# Patient Record
Sex: MALE | Race: WHITE | NOT HISPANIC OR LATINO | Employment: OTHER | URBAN - METROPOLITAN AREA
[De-identification: names, ages, dates, MRNs, and addresses within clinical notes are randomized per-mention and may not be internally consistent; named-entity substitution may affect disease eponyms.]

---

## 2023-03-06 LAB
BASOPHILS (10*3/UL) IN BLOOD BY AUTOMATED COUNT: 0.06 X10E9/L (ref 0–0.1)
BASOPHILS/100 LEUKOCYTES IN BLOOD BY AUTOMATED COUNT: 0.7 % (ref 0–2)
EOSINOPHILS (10*3/UL) IN BLOOD BY AUTOMATED COUNT: 0.51 X10E9/L (ref 0–0.4)
EOSINOPHILS/100 LEUKOCYTES IN BLOOD BY AUTOMATED COUNT: 6 % (ref 0–6)
ERYTHROCYTE DISTRIBUTION WIDTH (RATIO) BY AUTOMATED COUNT: 13.6 % (ref 11.5–14.5)
ERYTHROCYTE MEAN CORPUSCULAR HEMOGLOBIN CONCENTRATION (G/DL) BY AUTOMATED: 30.6 G/DL (ref 32–36)
ERYTHROCYTE MEAN CORPUSCULAR VOLUME (FL) BY AUTOMATED COUNT: 97 FL (ref 80–100)
ERYTHROCYTES (10*6/UL) IN BLOOD BY AUTOMATED COUNT: 3.84 X10E12/L (ref 4.5–5.9)
HEMATOCRIT (%) IN BLOOD BY AUTOMATED COUNT: 37.3 % (ref 41–52)
HEMOGLOBIN (G/DL) IN BLOOD: 11.4 G/DL (ref 13.5–17.5)
IMMATURE GRANULOCYTES/100 LEUKOCYTES IN BLOOD BY AUTOMATED COUNT: 0.4 % (ref 0–0.9)
LEUKOCYTES (10*3/UL) IN BLOOD BY AUTOMATED COUNT: 8.5 X10E9/L (ref 4.4–11.3)
LYMPHOCYTES (10*3/UL) IN BLOOD BY AUTOMATED COUNT: 1.17 X10E9/L (ref 0.8–3)
LYMPHOCYTES/100 LEUKOCYTES IN BLOOD BY AUTOMATED COUNT: 13.8 % (ref 13–44)
MONOCYTES (10*3/UL) IN BLOOD BY AUTOMATED COUNT: 0.59 X10E9/L (ref 0.05–0.8)
MONOCYTES/100 LEUKOCYTES IN BLOOD BY AUTOMATED COUNT: 7 % (ref 2–10)
NEUTROPHILS (10*3/UL) IN BLOOD BY AUTOMATED COUNT: 6.12 X10E9/L (ref 1.6–5.5)
NEUTROPHILS/100 LEUKOCYTES IN BLOOD BY AUTOMATED COUNT: 72.1 % (ref 40–80)
PLATELETS (10*3/UL) IN BLOOD AUTOMATED COUNT: 174 X10E9/L (ref 150–450)

## 2023-03-07 LAB
INR IN PPP BY COAGULATION ASSAY: 2.1 (ref 0.9–1.1)
PROTHROMBIN TIME (PT) IN PPP BY COAGULATION ASSAY: 24 SEC (ref 9.8–13.4)

## 2023-04-14 LAB
INR IN PPP BY COAGULATION ASSAY: 2 (ref 0.9–1.1)
PROTHROMBIN TIME (PT) IN PPP BY COAGULATION ASSAY: 23.1 SEC (ref 9.8–13.4)

## 2023-05-18 LAB
INR IN PPP BY COAGULATION ASSAY: 3.1 (ref 0.9–1.1)
PROTHROMBIN TIME (PT) IN PPP BY COAGULATION ASSAY: 36.2 SEC (ref 9.8–13.4)

## 2023-06-01 LAB
INR IN PPP BY COAGULATION ASSAY: 1.9 (ref 0.9–1.1)
PROTHROMBIN TIME (PT) IN PPP BY COAGULATION ASSAY: 21.6 SEC (ref 9.8–13.4)

## 2023-06-16 LAB
INR IN PPP BY COAGULATION ASSAY: 2 (ref 0.9–1.1)
PROTHROMBIN TIME (PT) IN PPP BY COAGULATION ASSAY: 23.5 SEC (ref 9.8–13.4)

## 2023-07-05 LAB
INR IN PPP BY COAGULATION ASSAY: 2.5 (ref 0.9–1.1)
PROTHROMBIN TIME (PT) IN PPP BY COAGULATION ASSAY: 28.8 SEC (ref 9.8–12.8)

## 2023-08-02 LAB
INR IN PPP BY COAGULATION ASSAY: 2.6 (ref 0.9–1.1)
PROTHROMBIN TIME (PT) IN PPP BY COAGULATION ASSAY: 30 SEC (ref 9.8–12.8)

## 2023-08-31 LAB
ALANINE AMINOTRANSFERASE (SGPT) (U/L) IN SER/PLAS: 17 U/L (ref 10–52)
ALBUMIN (G/DL) IN SER/PLAS: 4 G/DL (ref 3.4–5)
ALKALINE PHOSPHATASE (U/L) IN SER/PLAS: 68 U/L (ref 33–136)
ANION GAP IN SER/PLAS: 12 MMOL/L (ref 10–20)
ASPARTATE AMINOTRANSFERASE (SGOT) (U/L) IN SER/PLAS: 17 U/L (ref 9–39)
BASOPHILS (10*3/UL) IN BLOOD BY AUTOMATED COUNT: 0.05 X10E9/L (ref 0–0.1)
BASOPHILS/100 LEUKOCYTES IN BLOOD BY AUTOMATED COUNT: 0.7 % (ref 0–2)
BILIRUBIN TOTAL (MG/DL) IN SER/PLAS: 0.5 MG/DL (ref 0–1.2)
CALCIUM (MG/DL) IN SER/PLAS: 9 MG/DL (ref 8.6–10.3)
CARBON DIOXIDE, TOTAL (MMOL/L) IN SER/PLAS: 29 MMOL/L (ref 21–32)
CHLORIDE (MMOL/L) IN SER/PLAS: 106 MMOL/L (ref 98–107)
CHOLESTEROL (MG/DL) IN SER/PLAS: 99 MG/DL (ref 0–199)
CHOLESTEROL IN HDL (MG/DL) IN SER/PLAS: 41.6 MG/DL
CHOLESTEROL IN LDL (MG/DL) IN SER/PLAS BY DIRECT ASSAY: 46 MG/DL (ref 0–129)
CHOLESTEROL/HDL RATIO: 2.4
CREATININE (MG/DL) IN SER/PLAS: 1.39 MG/DL (ref 0.5–1.3)
EOSINOPHILS (10*3/UL) IN BLOOD BY AUTOMATED COUNT: 0.47 X10E9/L (ref 0–0.4)
EOSINOPHILS/100 LEUKOCYTES IN BLOOD BY AUTOMATED COUNT: 6.5 % (ref 0–6)
ERYTHROCYTE DISTRIBUTION WIDTH (RATIO) BY AUTOMATED COUNT: 13.9 % (ref 11.5–14.5)
ERYTHROCYTE MEAN CORPUSCULAR HEMOGLOBIN CONCENTRATION (G/DL) BY AUTOMATED: 32.3 G/DL (ref 32–36)
ERYTHROCYTE MEAN CORPUSCULAR VOLUME (FL) BY AUTOMATED COUNT: 94 FL (ref 80–100)
ERYTHROCYTES (10*6/UL) IN BLOOD BY AUTOMATED COUNT: 4.2 X10E12/L (ref 4.5–5.9)
GFR MALE: 52 ML/MIN/1.73M2
GLUCOSE (MG/DL) IN SER/PLAS: 134 MG/DL (ref 74–99)
HEMATOCRIT (%) IN BLOOD BY AUTOMATED COUNT: 39.3 % (ref 41–52)
HEMOGLOBIN (G/DL) IN BLOOD: 12.7 G/DL (ref 13.5–17.5)
IMMATURE GRANULOCYTES/100 LEUKOCYTES IN BLOOD BY AUTOMATED COUNT: 0.3 % (ref 0–0.9)
INR IN PPP BY COAGULATION ASSAY: 2.3 (ref 0.9–1.1)
LDL: 36 MG/DL (ref 0–99)
LEUKOCYTES (10*3/UL) IN BLOOD BY AUTOMATED COUNT: 7.2 X10E9/L (ref 4.4–11.3)
LYMPHOCYTES (10*3/UL) IN BLOOD BY AUTOMATED COUNT: 1.34 X10E9/L (ref 0.8–3)
LYMPHOCYTES/100 LEUKOCYTES IN BLOOD BY AUTOMATED COUNT: 18.6 % (ref 13–44)
MAGNESIUM (MG/DL) IN SER/PLAS: 1.99 MG/DL (ref 1.6–2.4)
MONOCYTES (10*3/UL) IN BLOOD BY AUTOMATED COUNT: 0.52 X10E9/L (ref 0.05–0.8)
MONOCYTES/100 LEUKOCYTES IN BLOOD BY AUTOMATED COUNT: 7.2 % (ref 2–10)
NATRIURETIC PEPTIDE B (PG/ML) IN SER/PLAS: 309 PG/ML (ref 0–99)
NEUTROPHILS (10*3/UL) IN BLOOD BY AUTOMATED COUNT: 4.8 X10E9/L (ref 1.6–5.5)
NEUTROPHILS/100 LEUKOCYTES IN BLOOD BY AUTOMATED COUNT: 66.7 % (ref 40–80)
PLATELETS (10*3/UL) IN BLOOD AUTOMATED COUNT: 189 X10E9/L (ref 150–450)
POTASSIUM (MMOL/L) IN SER/PLAS: 4.8 MMOL/L (ref 3.5–5.3)
PROTEIN TOTAL: 6.6 G/DL (ref 6.4–8.2)
PROTHROMBIN TIME (PT) IN PPP BY COAGULATION ASSAY: 26.7 SEC (ref 9.8–12.8)
SODIUM (MMOL/L) IN SER/PLAS: 142 MMOL/L (ref 136–145)
TRIGLYCERIDE (MG/DL) IN SER/PLAS: 109 MG/DL (ref 0–149)
UREA NITROGEN (MG/DL) IN SER/PLAS: 33 MG/DL (ref 6–23)
VLDL: 22 MG/DL (ref 0–40)

## 2023-09-27 LAB
INR IN PPP BY COAGULATION ASSAY: 2.6 (ref 0.9–1.1)
PROTHROMBIN TIME (PT) IN PPP BY COAGULATION ASSAY: 29.9 SEC (ref 9.8–12.8)

## 2023-10-03 DIAGNOSIS — R60.0 BILATERAL LEG EDEMA: ICD-10-CM

## 2023-10-25 ENCOUNTER — LAB (OUTPATIENT)
Dept: LAB | Facility: LAB | Age: 78
End: 2023-10-25
Payer: MEDICARE

## 2023-10-25 ENCOUNTER — ANTICOAGULATION - WARFARIN VISIT (OUTPATIENT)
Dept: CARDIOLOGY | Facility: CLINIC | Age: 78
End: 2023-10-25

## 2023-10-25 DIAGNOSIS — I48.0 PAROXYSMAL ATRIAL FIBRILLATION (MULTI): Primary | ICD-10-CM

## 2023-10-25 LAB
INR PPP: 2.3 (ref 0.9–1.1)
PROTHROMBIN TIME: 26.5 SECONDS (ref 9.8–12.8)

## 2023-10-25 PROCEDURE — 85610 PROTHROMBIN TIME: CPT

## 2023-10-25 PROCEDURE — 36415 COLL VENOUS BLD VENIPUNCTURE: CPT

## 2023-11-27 ENCOUNTER — ANTICOAGULATION - WARFARIN VISIT (OUTPATIENT)
Dept: CARDIOLOGY | Facility: CLINIC | Age: 78
End: 2023-11-27

## 2023-11-27 ENCOUNTER — LAB (OUTPATIENT)
Dept: LAB | Facility: LAB | Age: 78
End: 2023-11-27
Payer: MEDICARE

## 2023-11-27 DIAGNOSIS — I48.0 PAROXYSMAL ATRIAL FIBRILLATION (MULTI): Primary | ICD-10-CM

## 2023-11-27 LAB
INR PPP: 2.3 (ref 0.9–1.1)
PROTHROMBIN TIME: 26 SECONDS (ref 9.8–12.8)

## 2023-11-27 PROCEDURE — 36415 COLL VENOUS BLD VENIPUNCTURE: CPT

## 2023-11-27 PROCEDURE — 85610 PROTHROMBIN TIME: CPT

## 2023-11-28 ENCOUNTER — OFFICE VISIT (OUTPATIENT)
Dept: ENDOCRINOLOGY | Facility: CLINIC | Age: 78
End: 2023-11-28
Payer: MEDICARE

## 2023-11-28 VITALS — WEIGHT: 209 LBS | DIASTOLIC BLOOD PRESSURE: 71 MMHG | BODY MASS INDEX: 28.35 KG/M2 | SYSTOLIC BLOOD PRESSURE: 133 MMHG

## 2023-11-28 DIAGNOSIS — E78.5 HYPERLIPIDEMIA, UNSPECIFIED HYPERLIPIDEMIA TYPE: Primary | ICD-10-CM

## 2023-11-28 DIAGNOSIS — Z79.4 TYPE 2 DIABETES MELLITUS WITH DIABETIC POLYNEUROPATHY, WITH LONG-TERM CURRENT USE OF INSULIN (MULTI): ICD-10-CM

## 2023-11-28 DIAGNOSIS — Z97.8 USES SELF-APPLIED CONTINUOUS GLUCOSE MONITORING DEVICE: ICD-10-CM

## 2023-11-28 DIAGNOSIS — E11.42 TYPE 2 DIABETES MELLITUS WITH DIABETIC POLYNEUROPATHY, WITH LONG-TERM CURRENT USE OF INSULIN (MULTI): ICD-10-CM

## 2023-11-28 DIAGNOSIS — I10 HYPERTENSION, UNSPECIFIED TYPE: ICD-10-CM

## 2023-11-28 LAB
POC FINGERSTICK BLOOD GLUCOSE: 99 MG/DL (ref 70–100)
POC HEMOGLOBIN A1C: 7.4 % (ref 4.2–6.5)

## 2023-11-28 PROCEDURE — 83036 HEMOGLOBIN GLYCOSYLATED A1C: CPT | Performed by: STUDENT IN AN ORGANIZED HEALTH CARE EDUCATION/TRAINING PROGRAM

## 2023-11-28 PROCEDURE — 1126F AMNT PAIN NOTED NONE PRSNT: CPT | Performed by: STUDENT IN AN ORGANIZED HEALTH CARE EDUCATION/TRAINING PROGRAM

## 2023-11-28 PROCEDURE — 3075F SYST BP GE 130 - 139MM HG: CPT | Performed by: STUDENT IN AN ORGANIZED HEALTH CARE EDUCATION/TRAINING PROGRAM

## 2023-11-28 PROCEDURE — 1159F MED LIST DOCD IN RCRD: CPT | Performed by: STUDENT IN AN ORGANIZED HEALTH CARE EDUCATION/TRAINING PROGRAM

## 2023-11-28 PROCEDURE — 3078F DIAST BP <80 MM HG: CPT | Performed by: STUDENT IN AN ORGANIZED HEALTH CARE EDUCATION/TRAINING PROGRAM

## 2023-11-28 PROCEDURE — 95251 CONT GLUC MNTR ANALYSIS I&R: CPT | Performed by: STUDENT IN AN ORGANIZED HEALTH CARE EDUCATION/TRAINING PROGRAM

## 2023-11-28 PROCEDURE — 82962 GLUCOSE BLOOD TEST: CPT | Performed by: STUDENT IN AN ORGANIZED HEALTH CARE EDUCATION/TRAINING PROGRAM

## 2023-11-28 PROCEDURE — 99214 OFFICE O/P EST MOD 30 MIN: CPT | Performed by: STUDENT IN AN ORGANIZED HEALTH CARE EDUCATION/TRAINING PROGRAM

## 2023-11-28 RX ORDER — CARVEDILOL PHOSPHATE 80 MG/1
CAPSULE, EXTENDED RELEASE ORAL
COMMUNITY
Start: 2022-03-04 | End: 2024-02-23

## 2023-11-28 RX ORDER — INSULIN GLARGINE 100 [IU]/ML
INJECTION, SOLUTION SUBCUTANEOUS
COMMUNITY
Start: 2014-06-09 | End: 2024-02-05

## 2023-11-28 RX ORDER — CLOBETASOL PROPIONATE 0.46 MG/ML
SOLUTION TOPICAL
COMMUNITY
Start: 2023-07-07

## 2023-11-28 RX ORDER — ATORVASTATIN CALCIUM 40 MG/1
1 TABLET, FILM COATED ORAL DAILY
COMMUNITY
Start: 2014-02-11

## 2023-11-28 RX ORDER — TIOTROPIUM BROMIDE INHALATION SPRAY 3.12 UG/1
2 SPRAY, METERED RESPIRATORY (INHALATION) DAILY
COMMUNITY
Start: 2021-09-15

## 2023-11-28 RX ORDER — FUROSEMIDE 40 MG/1
1 TABLET ORAL 2 TIMES DAILY
COMMUNITY
Start: 2022-04-14 | End: 2024-03-11 | Stop reason: WASHOUT

## 2023-11-28 RX ORDER — DOFETILIDE 0.12 MG/1
1 CAPSULE ORAL 2 TIMES DAILY
COMMUNITY
Start: 2022-06-22 | End: 2024-03-15

## 2023-11-28 RX ORDER — CLOBETASOL PROPIONATE 0.05 G/100ML
SHAMPOO TOPICAL
COMMUNITY
Start: 2023-07-15

## 2023-11-28 RX ORDER — WARFARIN 2.5 MG/1
TABLET ORAL
COMMUNITY
Start: 2021-12-13 | End: 2024-01-30

## 2023-11-28 RX ORDER — INSULIN LISPRO 100 [IU]/ML
INJECTION, SOLUTION INTRAVENOUS; SUBCUTANEOUS
COMMUNITY
Start: 2015-02-23 | End: 2024-02-12

## 2023-11-28 RX ORDER — RAMIPRIL 10 MG/1
1 CAPSULE ORAL DAILY
COMMUNITY
Start: 2022-01-31 | End: 2024-01-23

## 2023-11-28 RX ORDER — METFORMIN HYDROCHLORIDE 1000 MG/1
1 TABLET ORAL DAILY
COMMUNITY
Start: 2016-03-19 | End: 2024-02-26

## 2023-11-28 RX ORDER — CLOBETASOL PROPIONATE 0.5 MG/G
CREAM TOPICAL
COMMUNITY
Start: 2023-07-07

## 2023-11-28 RX ORDER — LANOLIN ALCOHOL/MO/W.PET/CERES
1 CREAM (GRAM) TOPICAL DAILY
COMMUNITY

## 2023-11-28 RX ORDER — ALBUTEROL SULFATE 90 UG/1
2 AEROSOL, METERED RESPIRATORY (INHALATION) EVERY 4 HOURS PRN
COMMUNITY
Start: 2023-09-22

## 2023-11-28 RX ORDER — POTASSIUM CHLORIDE 750 MG/1
1 TABLET, EXTENDED RELEASE ORAL DAILY
COMMUNITY
Start: 2021-12-06 | End: 2024-05-24

## 2023-11-28 ASSESSMENT — ENCOUNTER SYMPTOMS: CONSTITUTIONAL NEGATIVE: 1

## 2023-11-28 NOTE — PROGRESS NOTES
Subjective   Patient ID: Servando Wells is a 78 y.o. male who presents for Diabetes.  Diabetes        A 78 yr old male, with hx of DM II, well controlled, complicated by neuropathy, presented for follow up.   He feels well , denies symptomatic lows. Unclear if lowe on CGM is accurate  He did not lower insulin dose as advised last visit  He gets laser treatment for neuropathy.  A1c is  7.4 was 6.6.     CGM review:  -Very high: 7 %  -High 14 %  -Target range 65 %  -Low 7 %  -Very low 7 %     Review of Systems   Constitutional: Negative.        Objective   Physical Exam  Constitutional:       Appearance: Normal appearance.   Cardiovascular:      Rate and Rhythm: Normal rate.   Neurological:      Mental Status: He is alert.         Assessment/Plan     Type II DM, well controlled, on MDI A1c 7.4 was 6.6 ,  on  MDI and Metformin, complicated  with diabetic neuropathy ( s/p laser treatment ) and hypoglycemia.  HLD well controlled on atorvasatin 40 mg   HTN controlled on ACE         Plan:  - ok to continue metformin 1000 mg BID  daily GFR 52   - Decrease Lantus to 18 units at night ( currently taking dose between 18-22 units)  -Continue humlaog 6 units with meals, plus sliding scale SS2     RTC in 3 months

## 2023-12-07 PROBLEM — I13.10 HYPERTENSIVE HEART AND CHRONIC KIDNEY DISEASE: Status: ACTIVE | Noted: 2023-12-07

## 2023-12-07 PROBLEM — M47.816 DEGENERATIVE JOINT DISEASE (DJD) OF LUMBAR SPINE: Status: ACTIVE | Noted: 2023-12-07

## 2023-12-07 PROBLEM — I10 ESSENTIAL HYPERTENSION, BENIGN: Status: ACTIVE | Noted: 2023-12-07

## 2023-12-07 PROBLEM — I51.89 DIASTOLIC DYSFUNCTION: Status: ACTIVE | Noted: 2023-12-07

## 2023-12-07 PROBLEM — G95.9 CERVICAL MYELOPATHY (MULTI): Status: ACTIVE | Noted: 2023-12-07

## 2023-12-07 PROBLEM — H90.3 ASYMMETRIC SNHL (SENSORINEURAL HEARING LOSS): Status: ACTIVE | Noted: 2023-12-07

## 2023-12-07 PROBLEM — R26.9 GAIT DISTURBANCE: Status: ACTIVE | Noted: 2023-12-07

## 2023-12-07 PROBLEM — L30.8 ASTEATOTIC ECZEMA: Status: ACTIVE | Noted: 2023-12-07

## 2023-12-07 PROBLEM — I50.30: Status: ACTIVE | Noted: 2023-12-07

## 2023-12-07 PROBLEM — R94.31 ABNORMAL EKG: Status: ACTIVE | Noted: 2023-12-07

## 2023-12-07 PROBLEM — H73.12 CHRONIC MYRINGITIS OF LEFT EAR: Status: ACTIVE | Noted: 2023-12-07

## 2023-12-07 PROBLEM — R60.0 BILATERAL LEG EDEMA: Status: ACTIVE | Noted: 2023-12-07

## 2023-12-07 PROBLEM — M16.0 DEGENERATIVE JOINT DISEASE OF BOTH HIPS: Status: ACTIVE | Noted: 2023-12-07

## 2023-12-07 PROBLEM — M50.03: Status: ACTIVE | Noted: 2023-12-07

## 2023-12-07 PROBLEM — G47.30 SLEEP APNEA: Status: ACTIVE | Noted: 2023-12-07

## 2023-12-07 PROBLEM — I11.9 HYPERTENSIVE HEART DISEASE: Status: ACTIVE | Noted: 2023-12-07

## 2023-12-07 PROBLEM — E11.40 DIABETES MELLITUS WITH NEUROPATHY (MULTI): Status: ACTIVE | Noted: 2023-12-07

## 2023-12-07 PROBLEM — R29.898 LEG WEAKNESS: Status: ACTIVE | Noted: 2023-12-07

## 2023-12-07 PROBLEM — C67.9 MALIGNANT NEOPLASM OF URINARY BLADDER (MULTI): Status: ACTIVE | Noted: 2023-12-07

## 2023-12-07 PROBLEM — L40.9 PSORIASIS: Status: ACTIVE | Noted: 2023-12-07

## 2023-12-07 PROBLEM — E78.5 HYPERLIPIDEMIA: Status: ACTIVE | Noted: 2023-12-07

## 2023-12-07 PROBLEM — H90.A32 MIXED CONDUCTIVE AND SENSORINEURAL HEARING LOSS, UNILATERAL, LEFT EAR WITH RESTRICTED HEARING ON THE CONTRALATERAL SIDE: Status: ACTIVE | Noted: 2023-12-07

## 2023-12-07 PROBLEM — R76.8 ANA POSITIVE: Status: ACTIVE | Noted: 2023-12-07

## 2023-12-07 PROBLEM — S76.319A HAMSTRING STRAIN: Status: ACTIVE | Noted: 2023-12-07

## 2023-12-07 PROBLEM — I48.0 PAROXYSMAL ATRIAL FIBRILLATION (MULTI): Status: ACTIVE | Noted: 2023-12-07

## 2023-12-07 PROBLEM — M17.9 OSTEOARTHRITIS OF KNEE: Status: ACTIVE | Noted: 2023-12-07

## 2023-12-07 RX ORDER — FUROSEMIDE 40 MG/1
40 TABLET ORAL 2 TIMES DAILY
Qty: 180 TABLET | Refills: 1 | Status: SHIPPED | OUTPATIENT
Start: 2023-12-07 | End: 2024-05-17

## 2023-12-26 ENCOUNTER — LAB (OUTPATIENT)
Dept: LAB | Facility: LAB | Age: 78
End: 2023-12-26
Payer: MEDICARE

## 2023-12-26 ENCOUNTER — ANTICOAGULATION - WARFARIN VISIT (OUTPATIENT)
Dept: CARDIOLOGY | Facility: CLINIC | Age: 78
End: 2023-12-26

## 2023-12-26 DIAGNOSIS — I48.0 PAROXYSMAL ATRIAL FIBRILLATION (MULTI): Primary | ICD-10-CM

## 2023-12-26 LAB
INR PPP: 2 (ref 0.9–1.1)
PROTHROMBIN TIME: 23 SECONDS (ref 9.8–12.8)

## 2023-12-26 PROCEDURE — 85610 PROTHROMBIN TIME: CPT

## 2023-12-26 PROCEDURE — 36415 COLL VENOUS BLD VENIPUNCTURE: CPT

## 2024-01-19 DIAGNOSIS — I10 ESSENTIAL HYPERTENSION, BENIGN: Primary | ICD-10-CM

## 2024-01-22 ENCOUNTER — LAB (OUTPATIENT)
Dept: LAB | Facility: LAB | Age: 79
End: 2024-01-22
Payer: MEDICARE

## 2024-01-22 ENCOUNTER — ANTICOAGULATION - WARFARIN VISIT (OUTPATIENT)
Dept: CARDIOLOGY | Facility: CLINIC | Age: 79
End: 2024-01-22

## 2024-01-22 DIAGNOSIS — I48.0 PAROXYSMAL ATRIAL FIBRILLATION (MULTI): Primary | ICD-10-CM

## 2024-01-22 LAB
INR PPP: 2 (ref 0.9–1.1)
PROTHROMBIN TIME: 22.7 SECONDS (ref 9.8–12.8)

## 2024-01-22 PROCEDURE — 36415 COLL VENOUS BLD VENIPUNCTURE: CPT

## 2024-01-22 PROCEDURE — 85610 PROTHROMBIN TIME: CPT

## 2024-01-23 RX ORDER — RAMIPRIL 10 MG/1
10 CAPSULE ORAL DAILY
Qty: 90 CAPSULE | Refills: 0 | Status: SHIPPED | OUTPATIENT
Start: 2024-01-23 | End: 2024-04-22

## 2024-01-30 DIAGNOSIS — I48.0 PAROXYSMAL ATRIAL FIBRILLATION (MULTI): Primary | ICD-10-CM

## 2024-01-30 RX ORDER — WARFARIN 2.5 MG/1
TABLET ORAL
Qty: 270 TABLET | Refills: 1 | Status: SHIPPED | OUTPATIENT
Start: 2024-01-30

## 2024-02-03 DIAGNOSIS — Z79.4 TYPE 2 DIABETES MELLITUS WITH DIABETIC POLYNEUROPATHY, WITH LONG-TERM CURRENT USE OF INSULIN (MULTI): ICD-10-CM

## 2024-02-03 DIAGNOSIS — E11.42 TYPE 2 DIABETES MELLITUS WITH DIABETIC POLYNEUROPATHY, WITH LONG-TERM CURRENT USE OF INSULIN (MULTI): ICD-10-CM

## 2024-02-05 RX ORDER — INSULIN GLARGINE 100 [IU]/ML
18 INJECTION, SOLUTION SUBCUTANEOUS DAILY
Qty: 30 ML | Refills: 1 | Status: SHIPPED | OUTPATIENT
Start: 2024-02-05 | End: 2024-02-08 | Stop reason: SDUPTHER

## 2024-02-08 ENCOUNTER — TELEPHONE (OUTPATIENT)
Dept: ENDOCRINOLOGY | Facility: CLINIC | Age: 79
End: 2024-02-08
Payer: MEDICARE

## 2024-02-08 DIAGNOSIS — E11.42 TYPE 2 DIABETES MELLITUS WITH DIABETIC POLYNEUROPATHY, WITH LONG-TERM CURRENT USE OF INSULIN (MULTI): ICD-10-CM

## 2024-02-08 DIAGNOSIS — Z79.4 TYPE 2 DIABETES MELLITUS WITH DIABETIC POLYNEUROPATHY, WITH LONG-TERM CURRENT USE OF INSULIN (MULTI): ICD-10-CM

## 2024-02-12 RX ORDER — INSULIN GLARGINE 100 [IU]/ML
18 INJECTION, SOLUTION SUBCUTANEOUS DAILY
Qty: 30 ML | Refills: 1 | Status: SHIPPED | OUTPATIENT
Start: 2024-02-12

## 2024-02-12 RX ORDER — INSULIN LISPRO 100 [IU]/ML
INJECTION, SOLUTION INTRAVENOUS; SUBCUTANEOUS
Qty: 45 ML | Refills: 1 | Status: SHIPPED | OUTPATIENT
Start: 2024-02-12

## 2024-02-20 ENCOUNTER — ANTICOAGULATION - WARFARIN VISIT (OUTPATIENT)
Dept: PRIMARY CARE | Facility: CLINIC | Age: 79
End: 2024-02-20

## 2024-02-20 ENCOUNTER — LAB (OUTPATIENT)
Dept: LAB | Facility: LAB | Age: 79
End: 2024-02-20
Payer: MEDICARE

## 2024-02-20 DIAGNOSIS — I48.0 PAROXYSMAL ATRIAL FIBRILLATION (MULTI): Primary | ICD-10-CM

## 2024-02-20 LAB
INR PPP: 1.6 (ref 0.9–1.1)
PROTHROMBIN TIME: 18.6 SECONDS (ref 9.8–12.8)

## 2024-02-20 PROCEDURE — 85610 PROTHROMBIN TIME: CPT

## 2024-02-20 PROCEDURE — 36415 COLL VENOUS BLD VENIPUNCTURE: CPT

## 2024-02-23 DIAGNOSIS — I11.9 HYPERTENSIVE HEART DISEASE, UNSPECIFIED WHETHER HEART FAILURE PRESENT: Primary | ICD-10-CM

## 2024-02-23 RX ORDER — CARVEDILOL PHOSPHATE 80 MG/1
80 CAPSULE, EXTENDED RELEASE ORAL DAILY
Qty: 90 CAPSULE | Refills: 3 | Status: SHIPPED | OUTPATIENT
Start: 2024-02-23

## 2024-02-26 DIAGNOSIS — Z79.4 TYPE 2 DIABETES MELLITUS WITH DIABETIC POLYNEUROPATHY, WITH LONG-TERM CURRENT USE OF INSULIN (MULTI): ICD-10-CM

## 2024-02-26 DIAGNOSIS — E11.42 TYPE 2 DIABETES MELLITUS WITH DIABETIC POLYNEUROPATHY, WITH LONG-TERM CURRENT USE OF INSULIN (MULTI): ICD-10-CM

## 2024-02-26 RX ORDER — METFORMIN HYDROCHLORIDE 1000 MG/1
1000 TABLET ORAL DAILY
Qty: 90 TABLET | Refills: 1 | Status: SHIPPED | OUTPATIENT
Start: 2024-02-26

## 2024-03-11 ENCOUNTER — OFFICE VISIT (OUTPATIENT)
Dept: CARDIOLOGY | Facility: CLINIC | Age: 79
End: 2024-03-11
Payer: MEDICARE

## 2024-03-11 ENCOUNTER — LAB (OUTPATIENT)
Dept: LAB | Facility: LAB | Age: 79
End: 2024-03-11
Payer: MEDICARE

## 2024-03-11 ENCOUNTER — ANTICOAGULATION - WARFARIN VISIT (OUTPATIENT)
Dept: CARDIOLOGY | Facility: CLINIC | Age: 79
End: 2024-03-11

## 2024-03-11 VITALS
HEART RATE: 62 BPM | WEIGHT: 212 LBS | BODY MASS INDEX: 28.71 KG/M2 | SYSTOLIC BLOOD PRESSURE: 118 MMHG | TEMPERATURE: 97.2 F | HEIGHT: 72 IN | DIASTOLIC BLOOD PRESSURE: 56 MMHG

## 2024-03-11 DIAGNOSIS — I48.0 PAROXYSMAL ATRIAL FIBRILLATION (MULTI): ICD-10-CM

## 2024-03-11 DIAGNOSIS — E78.2 MIXED HYPERLIPIDEMIA: ICD-10-CM

## 2024-03-11 DIAGNOSIS — Z87.891 FORMER SMOKER: ICD-10-CM

## 2024-03-11 DIAGNOSIS — Z79.01 CHRONIC ANTICOAGULATION: ICD-10-CM

## 2024-03-11 DIAGNOSIS — I10 ESSENTIAL HYPERTENSION, BENIGN: ICD-10-CM

## 2024-03-11 DIAGNOSIS — R60.0 BILATERAL LEG EDEMA: ICD-10-CM

## 2024-03-11 DIAGNOSIS — I50.30: Primary | ICD-10-CM

## 2024-03-11 DIAGNOSIS — I48.0 PAROXYSMAL ATRIAL FIBRILLATION (MULTI): Primary | ICD-10-CM

## 2024-03-11 DIAGNOSIS — I50.30: ICD-10-CM

## 2024-03-11 LAB
ANION GAP SERPL CALC-SCNC: 12 MMOL/L (ref 10–20)
BUN SERPL-MCNC: 39 MG/DL (ref 6–23)
CALCIUM SERPL-MCNC: 9.2 MG/DL (ref 8.6–10.3)
CHLORIDE SERPL-SCNC: 107 MMOL/L (ref 98–107)
CO2 SERPL-SCNC: 30 MMOL/L (ref 21–32)
CREAT SERPL-MCNC: 1.66 MG/DL (ref 0.5–1.3)
EGFRCR SERPLBLD CKD-EPI 2021: 42 ML/MIN/1.73M*2
ERYTHROCYTE [DISTWIDTH] IN BLOOD BY AUTOMATED COUNT: 13.4 % (ref 11.5–14.5)
GLUCOSE SERPL-MCNC: 65 MG/DL (ref 74–99)
HCT VFR BLD AUTO: 35.6 % (ref 41–52)
HGB BLD-MCNC: 11.5 G/DL (ref 13.5–17.5)
INR PPP: 2.2 (ref 0.9–1.1)
MCH RBC QN AUTO: 30.2 PG (ref 26–34)
MCHC RBC AUTO-ENTMCNC: 32.3 G/DL (ref 32–36)
MCV RBC AUTO: 93 FL (ref 80–100)
NRBC BLD-RTO: 0 /100 WBCS (ref 0–0)
PLATELET # BLD AUTO: 183 X10*3/UL (ref 150–450)
POTASSIUM SERPL-SCNC: 4.9 MMOL/L (ref 3.5–5.3)
PROTHROMBIN TIME: 24.9 SECONDS (ref 9.8–12.8)
RBC # BLD AUTO: 3.81 X10*6/UL (ref 4.5–5.9)
SODIUM SERPL-SCNC: 144 MMOL/L (ref 136–145)
WBC # BLD AUTO: 9.7 X10*3/UL (ref 4.4–11.3)

## 2024-03-11 PROCEDURE — 1160F RVW MEDS BY RX/DR IN RCRD: CPT | Performed by: INTERNAL MEDICINE

## 2024-03-11 PROCEDURE — 36415 COLL VENOUS BLD VENIPUNCTURE: CPT

## 2024-03-11 PROCEDURE — 85027 COMPLETE CBC AUTOMATED: CPT

## 2024-03-11 PROCEDURE — 83880 ASSAY OF NATRIURETIC PEPTIDE: CPT

## 2024-03-11 PROCEDURE — 1126F AMNT PAIN NOTED NONE PRSNT: CPT | Performed by: INTERNAL MEDICINE

## 2024-03-11 PROCEDURE — 85610 PROTHROMBIN TIME: CPT

## 2024-03-11 PROCEDURE — 99214 OFFICE O/P EST MOD 30 MIN: CPT | Performed by: INTERNAL MEDICINE

## 2024-03-11 PROCEDURE — 3078F DIAST BP <80 MM HG: CPT | Performed by: INTERNAL MEDICINE

## 2024-03-11 PROCEDURE — 3074F SYST BP LT 130 MM HG: CPT | Performed by: INTERNAL MEDICINE

## 2024-03-11 PROCEDURE — 1159F MED LIST DOCD IN RCRD: CPT | Performed by: INTERNAL MEDICINE

## 2024-03-11 PROCEDURE — 80048 BASIC METABOLIC PNL TOTAL CA: CPT

## 2024-03-11 PROCEDURE — 1036F TOBACCO NON-USER: CPT | Performed by: INTERNAL MEDICINE

## 2024-03-11 ASSESSMENT — ENCOUNTER SYMPTOMS
PALPITATIONS: 0
NUMBNESS: 1
DIFFICULTY URINATING: 1
RESPIRATORY NEGATIVE: 1
PSYCHIATRIC NEGATIVE: 1
ACTIVITY CHANGE: 1
GASTROINTESTINAL NEGATIVE: 1
ARTHRALGIAS: 1
APPETITE CHANGE: 0

## 2024-03-11 ASSESSMENT — PATIENT HEALTH QUESTIONNAIRE - PHQ9
SUM OF ALL RESPONSES TO PHQ9 QUESTIONS 1 AND 2: 0
2. FEELING DOWN, DEPRESSED OR HOPELESS: NOT AT ALL
1. LITTLE INTEREST OR PLEASURE IN DOING THINGS: NOT AT ALL

## 2024-03-11 NOTE — PROGRESS NOTES
"  Patient:  Servando Wells  YOB: 1945  MRN: [id number]       Chief Complaint/Active Symptoms:       Servando Wells is a 78 y.o. male who returns today for cardiac follow-up.     Here for routine follow-up. Biggest is his leg fatigue and neuropathy. Is waiting for a \"machine\" that is supposed to help neuropathy. Has tingling and sharp stabbing pains to mid-shin. Difficult for him to walk with this.     No chest pain or discomfort, no shortness of breath. Edema well controlled on his current diuretic regimen. No palpitations. No dizziness or lightheadedness (except when sugar is low).     No other concerns than his neuropathy.     Review of Systems   Constitutional:  Positive for activity change. Negative for appetite change.   HENT:  Positive for hearing loss.    Respiratory: Negative.     Cardiovascular:  Positive for leg swelling. Negative for chest pain and palpitations.   Gastrointestinal: Negative.    Genitourinary:  Positive for difficulty urinating.   Musculoskeletal:  Positive for arthralgias.   Neurological:  Positive for numbness.   Psychiatric/Behavioral: Negative.     All other systems reviewed and are negative.      Objective:     Vitals:    03/11/24 1108   BP: 118/56   Pulse: 62   Temp: 36.2 °C (97.2 °F)       Vitals:    03/11/24 1108   Weight: 96.2 kg (212 lb)       Allergies:     No Known Allergies     Medications:     Current Outpatient Medications   Medication Instructions    albuterol 90 mcg/actuation inhaler 2 puffs, inhalation, Every 4 hours PRN    atorvastatin (Lipitor) 40 mg tablet 1 tablet, oral, Daily    carvedilol CR (COREG CR) 80 mg, oral, Daily    clobetasol (Temovate) 0.05 % cream     clobetasol (Temovate) 0.05 % external solution     clobetasoL 0.05 % shampoo     dofetilide (Tikosyn) 125 mcg capsule 1 capsule, oral, 2 times daily    furosemide (LASIX) 40 mg, oral, 2 times daily    HumaLOG KwikPen Insulin 100 unit/mL injection INJECT 8 UNITS BEFORE MEALS PLUS SLIDING SCALE    " Lantus Solostar U-100 Insulin 18 Units, subcutaneous, Daily    magnesium oxide (Mag-Ox) 400 mg (241.3 mg magnesium) tablet 1 tablet, oral, Daily    metFORMIN (GLUCOPHAGE) 1,000 mg, oral, Daily    potassium chloride CR 10 mEq ER tablet 1 tablet, oral, Daily    ramipril (ALTACE) 10 mg, oral, Daily    Spiriva Respimat 2.5 mcg/actuation inhaler 2 puffs, inhalation, Daily    warfarin (Coumadin) 2.5 mg tablet TAKE 1 TO 3 TABLETS DAILY OR AS DIRECTED BY NO       Physical Examination:   GENERAL:  Well developed, well nourished, in no acute distress.  HEENT: NC AT, EOMI with anicteric sclera  NECK: Markedly reduced range of motion, no JVD, no bruit.  CHEST:  Symmetric and nontender.  LUNGS:  Clear to auscultation bilaterally, normal respiratory effort.  Diminished at the bases  HEART: PMI is nonpalpable.  There is a regular rhythm breathing irregular with a normal S1 and S2 without S3.  There is a soft crescendo murmur present at the right upper sternal border no diastolic murmur.  There is 1+ ankle edema bilaterally on the right slightly more than the left with venous varicosities.  Normal distal perfusion.    ABDOMEN: Soft, NT, ND without palpable organomegaly or bruits  EXTREMITIES:  Warm with good color, no clubbing or cyanosis.  There is 1+ ankle edema noted.  PERIPHERAL VASCULAR:  Pulses present and equally palpable;   MUSCULOSKELETAL: Arthritic changes.  Patient ambulates slowly with a cane  NEURO/PSYCH:  Alert and oriented times three with approppriate behavior and responses. Nonfocal motor examination.  Lymph: No significant palpable lymphadenopathy  Skin: no rash or lesions on exposed skin or reported.    Lab:     CBC:   Lab Results   Component Value Date    WBC 7.2 08/31/2023    RBC 4.20 (L) 08/31/2023    HGB 12.7 (L) 08/31/2023    HCT 39.3 (L) 08/31/2023     08/31/2023        CMP:    Lab Results   Component Value Date     08/31/2023    K 4.8 08/31/2023     08/31/2023    CO2 29 08/31/2023     "BUN 33 (H) 08/31/2023    CREATININE 1.39 (H) 08/31/2023    GLUCOSE 134 (H) 08/31/2023    CALCIUM 9.0 08/31/2023       Magnesium:    Lab Results   Component Value Date    MG 1.99 08/31/2023       Lipid Profile:    Lab Results   Component Value Date    TRIG 109 08/31/2023    HDL 41.6 08/31/2023    LDLDIRECT 46 08/31/2023       TSH:    Lab Results   Component Value Date    TSH 3.33 01/07/2021       BNP:   Lab Results   Component Value Date     (H) 08/31/2023        PT/INR:    Lab Results   Component Value Date    PROTIME 18.6 (H) 02/20/2024    INR 1.6 (H) 02/20/2024       HgBA1c:    Lab Results   Component Value Date    HGBA1C 7.4 (A) 11/28/2023       BMP:  Lab Results   Component Value Date     08/31/2023     08/29/2022     01/31/2022    K 4.8 08/31/2023    K 4.7 08/29/2022    K 4.9 01/31/2022     08/31/2023     08/29/2022     01/31/2022    CO2 29 08/31/2023    CO2 28 08/29/2022    CO2 30 01/31/2022    BUN 33 (H) 08/31/2023    BUN 54 (H) 08/29/2022    BUN 24 (H) 01/31/2022    CREATININE 1.39 (H) 08/31/2023    CREATININE 1.76 (H) 08/29/2022    CREATININE 1.39 (H) 01/31/2022       Cardiac Enzymes:    No results found for: \"TROPHS\"    Hepatic Function Panel:    Lab Results   Component Value Date    ALKPHOS 68 08/31/2023    ALT 17 08/31/2023    AST 17 08/31/2023    PROT 6.6 08/31/2023    BILITOT 0.5 08/31/2023         Diagnostic Studies:     No echocardiogram results found for the past 12 months    No nuclear medicine results found for the past 12 months    No valid procedures specified.    EKG:   No results found for: \"EKG\"    Radiology:     No orders to display     ASSESSMENT     Problem List Items Addressed This Visit       Bilateral leg edema    Essential hypertension, benign    Heart failure with preserved ejection fraction, borderline, class III (CMS/HCC) - Primary    Relevant Orders    Basic Metabolic Panel    B-Type Natriuretic Peptide    Hyperlipidemia    Paroxysmal " atrial fibrillation (CMS/HCC)    Relevant Orders    CBC    Chronic anticoagulation    Relevant Orders    CBC    Former smoker       PLAN   1.  Chronic diastolic heart failure.  Reasonably compensated with only mild peripheral edema but without JVD or pulmonary edema.  Continue his current therapy.  2.  Benign essential hypertension.  Blood pressures are controlled on his current medical regimen.  3.  Paroxysmal atrial fibrillation on chronic anticoagulation.  No bleeding complications but will check a CBC with his heart failure we will check his electrolytes at the same time.  4.  Bilateral leg edema.  Reasonably controlled with his current diuretic regimen and salt restriction.  5.  Hyperlipidemia.  Patient is on conservative medical management.  He will be due for his repeat lipid panel with his labs late this summer early this fall.  6.  Tobacco and weight status.  The patient is a current non-smoker BMI is 28 we have encouraged heart healthy Mediterranean diet.    His lab work is stable we will see him in follow-up in 6 months sooner if there is any problems or concerns

## 2024-03-12 LAB — BNP SERPL-MCNC: 322 PG/ML (ref 0–99)

## 2024-03-13 ENCOUNTER — TELEPHONE (OUTPATIENT)
Dept: CARDIOLOGY | Facility: CLINIC | Age: 79
End: 2024-03-13
Payer: MEDICARE

## 2024-03-13 NOTE — TELEPHONE ENCOUNTER
----- Message from Eliane Bain LPN sent at 3/13/2024  7:44 AM EDT -----    ----- Message -----  From: Jennifer Dyson MD  Sent: 3/12/2024   3:49 PM EDT  To: Eliane Bain LPN    Mild worsening of his kidney insufficiency.  Has he had his diuretics increased or is even not drinking as much fluids recently.  If so please have him go back to his previous doses and if he has not seen nephrologist we need to have him referred from 1  ----- Message -----  From: Lab, Background User  Sent: 3/11/2024   8:12 PM EDT  To: Jennifer Dyson MD

## 2024-03-14 DIAGNOSIS — I48.0 PAROXYSMAL ATRIAL FIBRILLATION (MULTI): Primary | ICD-10-CM

## 2024-03-15 RX ORDER — DOFETILIDE 0.12 MG/1
125 CAPSULE ORAL 2 TIMES DAILY
Qty: 180 CAPSULE | Refills: 1 | Status: SHIPPED | OUTPATIENT
Start: 2024-03-15 | End: 2024-09-11

## 2024-03-21 ENCOUNTER — TELEPHONE (OUTPATIENT)
Dept: CARDIOLOGY | Facility: CLINIC | Age: 79
End: 2024-03-21
Payer: MEDICARE

## 2024-03-21 NOTE — TELEPHONE ENCOUNTER
Patient advised.     Labs for heart failure unchanged - if he is doing well would hold tight on current regimen. If worse let me know and will increase diuretics.

## 2024-03-25 ENCOUNTER — OFFICE VISIT (OUTPATIENT)
Dept: ENDOCRINOLOGY | Facility: CLINIC | Age: 79
End: 2024-03-25
Payer: MEDICARE

## 2024-03-25 VITALS
BODY MASS INDEX: 28.71 KG/M2 | HEIGHT: 72 IN | WEIGHT: 212 LBS | SYSTOLIC BLOOD PRESSURE: 153 MMHG | DIASTOLIC BLOOD PRESSURE: 64 MMHG

## 2024-03-25 DIAGNOSIS — E11.42 TYPE 2 DIABETES MELLITUS WITH DIABETIC POLYNEUROPATHY, WITH LONG-TERM CURRENT USE OF INSULIN (MULTI): ICD-10-CM

## 2024-03-25 DIAGNOSIS — E16.2 HYPOGLYCEMIA: Primary | ICD-10-CM

## 2024-03-25 DIAGNOSIS — Z97.8 USES SELF-APPLIED CONTINUOUS GLUCOSE MONITORING DEVICE: ICD-10-CM

## 2024-03-25 DIAGNOSIS — Z79.4 TYPE 2 DIABETES MELLITUS WITH DIABETIC POLYNEUROPATHY, WITH LONG-TERM CURRENT USE OF INSULIN (MULTI): ICD-10-CM

## 2024-03-25 LAB
POC FINGERSTICK BLOOD GLUCOSE: 119 MG/DL (ref 70–100)
POC HEMOGLOBIN A1C: 6.8 % (ref 4.2–6.5)

## 2024-03-25 PROCEDURE — 3077F SYST BP >= 140 MM HG: CPT | Performed by: STUDENT IN AN ORGANIZED HEALTH CARE EDUCATION/TRAINING PROGRAM

## 2024-03-25 PROCEDURE — 99214 OFFICE O/P EST MOD 30 MIN: CPT | Performed by: STUDENT IN AN ORGANIZED HEALTH CARE EDUCATION/TRAINING PROGRAM

## 2024-03-25 PROCEDURE — 1159F MED LIST DOCD IN RCRD: CPT | Performed by: STUDENT IN AN ORGANIZED HEALTH CARE EDUCATION/TRAINING PROGRAM

## 2024-03-25 PROCEDURE — 82962 GLUCOSE BLOOD TEST: CPT | Performed by: STUDENT IN AN ORGANIZED HEALTH CARE EDUCATION/TRAINING PROGRAM

## 2024-03-25 PROCEDURE — 83036 HEMOGLOBIN GLYCOSYLATED A1C: CPT | Performed by: STUDENT IN AN ORGANIZED HEALTH CARE EDUCATION/TRAINING PROGRAM

## 2024-03-25 PROCEDURE — 95251 CONT GLUC MNTR ANALYSIS I&R: CPT | Performed by: STUDENT IN AN ORGANIZED HEALTH CARE EDUCATION/TRAINING PROGRAM

## 2024-03-25 PROCEDURE — 3078F DIAST BP <80 MM HG: CPT | Performed by: STUDENT IN AN ORGANIZED HEALTH CARE EDUCATION/TRAINING PROGRAM

## 2024-03-25 PROCEDURE — 1036F TOBACCO NON-USER: CPT | Performed by: STUDENT IN AN ORGANIZED HEALTH CARE EDUCATION/TRAINING PROGRAM

## 2024-03-25 PROCEDURE — 1160F RVW MEDS BY RX/DR IN RCRD: CPT | Performed by: STUDENT IN AN ORGANIZED HEALTH CARE EDUCATION/TRAINING PROGRAM

## 2024-03-25 ASSESSMENT — ENCOUNTER SYMPTOMS: CONSTITUTIONAL NEGATIVE: 1

## 2024-03-25 NOTE — PROGRESS NOTES
Subjective   Patient ID: Servando Wells is a 78 y.o. male who presents for Diabetes (DX dm: >15 years ago /PCP: Puma Mahan /Podiatry: neuropathy-sees  /Eye exam: 1.5 years ago, does have apt soon /Pt testing glucose 4 times daily with freestyle fernanda (original) - READER. Due to fluctuating blood glucose, hypoglycemia and 4 insulin injections. Adjusting meal time insulin based on glucose; compliant with dm regimen/Last hga1c 11/28/23 7.4%/).  Lab Results   Component Value Date    HGBA1C 6.8 (A) 03/25/2024      HPI  A 78 yr old male, with hx of DM II, well controlled, complicated by neuropathy, presented for follow up.   He is doing well, no new concern other recurrence of neuropathy he will   restart laser treatment for neuropathy with Dr. Burris   His dog was diagnosed with DM   A1c is  6.8 was 7.4 , 6.6.      I personally reviewed CGM downloads  -Very high: 0 %  -High 13%  -Target range 66 %  -Low 10 %  -Very low  11%     He states lows are not accurate , but does admit to using humalog before going to bed if BG was high or if he ate a high carb dinner/snack        Review of Systems   Constitutional: Negative.        Objective   Physical Exam Visit Vitals  /64   Ht 1.829 m (6')   Wt 96.2 kg (212 lb) Comment: per patient- balance issue so pt gave verbal weight   BMI 28.75 kg/m²   Smoking Status Former   BSA 2.21 m²        Assessment/Plan        Type II DM, well controlled, on MDI A1c 7.4 was 6.8 ,  on  MDI and Metformin, complicated  with diabetic neuropathy ( s/p laser treatment ) and hypoglycemia.    HLD well controlled on atorvasatin 40 mg   HTN controlled on ACE         Plan:  - Advised to not use Humalog before bedtime to prevent hypoglycemia   - Reduce metformin to a total of 1000 mg  daily GFR 42   - continue Lantus to 18 units at night ( currently taking dose between 18-22 units)  -Continue humlaog 6 units with meals, plus sliding scale SS2     RTC in 3-4 months

## 2024-04-08 ENCOUNTER — TELEPHONE (OUTPATIENT)
Dept: PRIMARY CARE | Facility: CLINIC | Age: 79
End: 2024-04-08

## 2024-04-08 ENCOUNTER — LAB (OUTPATIENT)
Dept: LAB | Facility: LAB | Age: 79
End: 2024-04-08
Payer: MEDICARE

## 2024-04-08 DIAGNOSIS — I48.0 PAROXYSMAL ATRIAL FIBRILLATION (MULTI): ICD-10-CM

## 2024-04-08 DIAGNOSIS — Z79.01 LONG TERM (CURRENT) USE OF ANTICOAGULANTS: Primary | ICD-10-CM

## 2024-04-08 NOTE — TELEPHONE ENCOUNTER
Pt left vm stating his Pt/INR order has , can you please place standing order and fax to [phone].    Thanks.

## 2024-04-12 ENCOUNTER — LAB (OUTPATIENT)
Dept: LAB | Facility: LAB | Age: 79
End: 2024-04-12
Payer: MEDICARE

## 2024-04-12 ENCOUNTER — ANTICOAGULATION - WARFARIN VISIT (OUTPATIENT)
Dept: CARDIOLOGY | Facility: CLINIC | Age: 79
End: 2024-04-12

## 2024-04-12 DIAGNOSIS — I48.0 PAROXYSMAL ATRIAL FIBRILLATION (MULTI): ICD-10-CM

## 2024-04-12 LAB
INR PPP: 1.8 (ref 0.9–1.1)
PROTHROMBIN TIME: 20 SECONDS (ref 9.8–12.8)

## 2024-04-12 PROCEDURE — 36415 COLL VENOUS BLD VENIPUNCTURE: CPT

## 2024-04-12 PROCEDURE — 85610 PROTHROMBIN TIME: CPT

## 2024-04-22 DIAGNOSIS — I10 ESSENTIAL HYPERTENSION, BENIGN: ICD-10-CM

## 2024-04-22 RX ORDER — RAMIPRIL 10 MG/1
10 CAPSULE ORAL DAILY
Qty: 90 CAPSULE | Refills: 1 | Status: SHIPPED | OUTPATIENT
Start: 2024-04-22 | End: 2024-10-19

## 2024-04-23 ENCOUNTER — ANTICOAGULATION - WARFARIN VISIT (OUTPATIENT)
Dept: CARDIOLOGY | Facility: CLINIC | Age: 79
End: 2024-04-23

## 2024-04-23 ENCOUNTER — LAB (OUTPATIENT)
Dept: LAB | Facility: LAB | Age: 79
End: 2024-04-23
Payer: MEDICARE

## 2024-04-23 DIAGNOSIS — I48.0 PAROXYSMAL ATRIAL FIBRILLATION (MULTI): ICD-10-CM

## 2024-04-23 LAB
INR PPP: 2.6 (ref 0.9–1.1)
PROTHROMBIN TIME: 29.9 SECONDS (ref 9.8–12.8)

## 2024-04-23 PROCEDURE — 85610 PROTHROMBIN TIME: CPT

## 2024-04-23 PROCEDURE — 36415 COLL VENOUS BLD VENIPUNCTURE: CPT

## 2024-04-24 ENCOUNTER — OFFICE VISIT (OUTPATIENT)
Dept: NEPHROLOGY | Facility: CLINIC | Age: 79
End: 2024-04-24
Payer: MEDICARE

## 2024-04-24 VITALS — SYSTOLIC BLOOD PRESSURE: 129 MMHG | DIASTOLIC BLOOD PRESSURE: 69 MMHG | HEART RATE: 67 BPM

## 2024-04-24 DIAGNOSIS — I10 ESSENTIAL HYPERTENSION: ICD-10-CM

## 2024-04-24 DIAGNOSIS — E08.22 DIABETES MELLITUS DUE TO UNDERLYING CONDITION WITH DIABETIC CHRONIC KIDNEY DISEASE, UNSPECIFIED CKD STAGE, UNSPECIFIED WHETHER LONG TERM INSULIN USE (MULTI): ICD-10-CM

## 2024-04-24 DIAGNOSIS — N18.32 STAGE 3B CHRONIC KIDNEY DISEASE (MULTI): Primary | ICD-10-CM

## 2024-04-24 PROCEDURE — 1036F TOBACCO NON-USER: CPT | Performed by: INTERNAL MEDICINE

## 2024-04-24 PROCEDURE — 1159F MED LIST DOCD IN RCRD: CPT | Performed by: INTERNAL MEDICINE

## 2024-04-24 PROCEDURE — 1160F RVW MEDS BY RX/DR IN RCRD: CPT | Performed by: INTERNAL MEDICINE

## 2024-04-24 PROCEDURE — 99204 OFFICE O/P NEW MOD 45 MIN: CPT | Performed by: INTERNAL MEDICINE

## 2024-04-24 PROCEDURE — 3078F DIAST BP <80 MM HG: CPT | Performed by: INTERNAL MEDICINE

## 2024-04-24 PROCEDURE — 3074F SYST BP LT 130 MM HG: CPT | Performed by: INTERNAL MEDICINE

## 2024-04-24 NOTE — PROGRESS NOTES
Servando Wells   79 y.o.      Vitals:      MRN/Room: [id number]/Room/bed info not found      History Of Present Illness  Servando Wells is a 79 y.o. male presenting with high creatinine level.     Past Medical History  He has a past medical history of Encounter for immunization (07/20/2021), Encounter for preprocedural cardiovascular examination, Foreign body in ear, unspecified ear, initial encounter (03/16/2021), Heart disease, unspecified, Impacted cerumen, left ear (03/16/2021), Localized edema (02/15/2022), Long term (current) use of aspirin (07/20/2015), Male erectile dysfunction, unspecified, Obstructive sleep apnea (adult) (pediatric) (12/04/2013), Personal history of diseases of the skin and subcutaneous tissue (07/20/2016), Personal history of malignant neoplasm, unspecified, Personal history of other diseases of the nervous system and sense organs (04/02/2021), Personal history of other diseases of urinary system, Personal history of other specified conditions, Personal history of other specified conditions, Personal history of pneumonia (recurrent) (01/10/2017), Superficial mycosis, unspecified (10/07/2021), Unspecified otitis externa, unspecified ear (09/07/2021), and Unspecified otitis externa, unspecified ear (04/15/2021).    Surgical History  He has a past surgical history that includes Other surgical history (12/09/2021); Other surgical history (12/09/2021); Other surgical history (12/09/2021); Other surgical history (12/09/2021); Other surgical history (12/09/2021); Other surgical history (12/09/2021); Other surgical history (12/09/2021); Other surgical history (02/01/2019); Other surgical history (02/11/2014); and Bladder surgery (02/11/2014).     Social History  He reports that he quit smoking about 33 years ago. His smoking use included cigarettes. He started smoking about 55 years ago. He has a 22 pack-year smoking history. He has never used smokeless tobacco. He reports current alcohol use. He  reports that he does not currently use drugs.    Family History  Family History   Problem Relation Name Age of Onset    Hypertension Father      Diabetes Father          Allergies  Patient has no known allergies.      Meds:         Current Outpatient Medications   Medication Sig Dispense Refill    albuterol 90 mcg/actuation inhaler Inhale 2 puffs every 4 hours if needed.      atorvastatin (Lipitor) 40 mg tablet Take 1 tablet (40 mg) by mouth once daily.      carvedilol CR (Coreg CR) 80 mg 24 hr capsule TAKE 1 CAPSULE DAILY 90 capsule 3    clobetasol (Temovate) 0.05 % cream       clobetasol (Temovate) 0.05 % external solution       clobetasoL 0.05 % shampoo       dofetilide (Tikosyn) 125 mcg capsule Take 1 capsule (125 mcg) by mouth 2 times a day. 180 capsule 1    furosemide (Lasix) 40 mg tablet TAKE 1 TABLET TWICE A DAY (Patient taking differently: Take 1 tablet (40 mg) by mouth once daily.) 180 tablet 1    HumaLOG KwikPen Insulin 100 unit/mL injection INJECT 8 UNITS BEFORE MEALS PLUS SLIDING SCALE 45 mL 1    insulin glargine (Lantus Solostar U-100 Insulin) 100 unit/mL (3 mL) pen Inject 18 Units under the skin once daily. 30 mL 1    magnesium oxide (Mag-Ox) 400 mg (241.3 mg magnesium) tablet Take 1 tablet (400 mg) by mouth once daily.      metFORMIN (Glucophage) 1,000 mg tablet TAKE 1 TABLET DAILY 90 tablet 1    potassium chloride CR 10 mEq ER tablet Take 1 tablet (10 mEq) by mouth once daily.      ramipril (Altace) 10 mg capsule Take 1 capsule (10 mg) by mouth once daily. 90 capsule 1    Spiriva Respimat 2.5 mcg/actuation inhaler Inhale 2 puffs once daily.      warfarin (Coumadin) 2.5 mg tablet TAKE 1 TO 3 TABLETS DAILY OR AS DIRECTED BY [facility] 270 tablet 1     No current facility-administered medications for this visit.         ROS:  The patient is awake and oriented. No dizziness or lightheadedness. No chills and no fever. No headaches. No nausea and no vomiting. No shortness of breath. No cough. No sputum. No chest  pain. No chest tightness. No abdominal pain. No diarrhea and no constipation. No hematemesis or hemoptysis. No hematuria. No rectal bleeding. No melena. No epistaxis. No urinary symptoms. No flank pain. No leg edema. No leg pain. No weakness. No itching. Overall, the rest of the review of systems is also negative.  12 point review of systems otherwise negative as stated in HPI.        Physical Exam:        Vitals:    04/24/24 1255   BP: 129/69   Pulse: 67     General: The patient is awake, oriented, and is not in any distress.  Head and Neck: Normocephalic. No periorbital edema.  Respiratory: Symmetric air entry. Symmetric chest expansion.No respiratory distress.  Cardiovascular: Symmetric peripheral pulses.  Skin: No maculopapular rash.  Musculoskeletal: No peripheral edema in both left and right upper extremities.  No edema in either left or right lower extremities.  Neuro Exam: Speech is fluent. Moves extremities.        Blood Labs:  No results found for this or any previous visit (from the past 24 hour(s)).   Lab Results   Component Value Date    GLUCOSE 65 (L) 03/11/2024    CALCIUM 9.2 03/11/2024     03/11/2024    K 4.9 03/11/2024    CO2 30 03/11/2024     03/11/2024    BUN 39 (H) 03/11/2024    CREATININE 1.66 (H) 03/11/2024       Imaging:        Assessment and Plan:  #1 chronic kidney disease stage III.  Last creatinine was 1.6.  The patient has long history of diabetes and hypertension.  Volume status is fine.  Blood pressure is under control.  I asked for spot urine protein to creatinine ratio and microscopic urinalysis.  I asked for PTH, phosphorus, 25-hydroxy vitamin D, and a kidney ultrasound.    2.  Hypertension.  Blood pressure is under control.  Continue the current medications.    3.  Diabetes.  I asked for a spot urine protein to creatinine ratio.    I will see him in about 2 to 3 weeks for follow-up.    Toribio Sosa MD  Senior Attending Physician  Director of Onco-Nephrology  Program  Division of Nephrology & Hypertension  [facility]

## 2024-04-26 ENCOUNTER — APPOINTMENT (OUTPATIENT)
Dept: RADIOLOGY | Facility: CLINIC | Age: 79
End: 2024-04-26
Payer: MEDICARE

## 2024-04-26 ENCOUNTER — HOSPITAL ENCOUNTER (OUTPATIENT)
Dept: RADIOLOGY | Facility: HOSPITAL | Age: 79
Discharge: HOME | End: 2024-04-26
Payer: MEDICARE

## 2024-04-26 ENCOUNTER — LAB (OUTPATIENT)
Dept: LAB | Facility: LAB | Age: 79
End: 2024-04-26
Payer: MEDICARE

## 2024-04-26 DIAGNOSIS — N18.32 STAGE 3B CHRONIC KIDNEY DISEASE (MULTI): ICD-10-CM

## 2024-04-26 DIAGNOSIS — E08.22 DIABETES MELLITUS DUE TO UNDERLYING CONDITION WITH DIABETIC CHRONIC KIDNEY DISEASE, UNSPECIFIED CKD STAGE, UNSPECIFIED WHETHER LONG TERM INSULIN USE (MULTI): ICD-10-CM

## 2024-04-26 DIAGNOSIS — I10 ESSENTIAL HYPERTENSION: ICD-10-CM

## 2024-04-26 DIAGNOSIS — E55.9 VITAMIN D DEFICIENCY: Primary | ICD-10-CM

## 2024-04-26 DIAGNOSIS — E21.3 HYPERPARATHYROIDISM (MULTI): ICD-10-CM

## 2024-04-26 LAB
25(OH)D3 SERPL-MCNC: 21 NG/ML (ref 30–100)
ANION GAP SERPL CALC-SCNC: 14 MMOL/L (ref 10–20)
BUN SERPL-MCNC: 35 MG/DL (ref 6–23)
CALCIUM SERPL-MCNC: 8.7 MG/DL (ref 8.6–10.3)
CHLORIDE SERPL-SCNC: 106 MMOL/L (ref 98–107)
CO2 SERPL-SCNC: 25 MMOL/L (ref 21–32)
CREAT SERPL-MCNC: 1.68 MG/DL (ref 0.5–1.3)
CREAT UR-MCNC: 53.1 MG/DL (ref 20–370)
EGFRCR SERPLBLD CKD-EPI 2021: 41 ML/MIN/1.73M*2
GLUCOSE SERPL-MCNC: 85 MG/DL (ref 74–99)
HYALINE CASTS #/AREA URNS AUTO: ABNORMAL /LPF
POTASSIUM SERPL-SCNC: 4.8 MMOL/L (ref 3.5–5.3)
PROT UR-ACNC: 8 MG/DL (ref 5–25)
PROT/CREAT UR: 0.15 MG/MG CREAT (ref 0–0.17)
PTH-INTACT SERPL-MCNC: 112.1 PG/ML (ref 18.5–88)
RBC #/AREA URNS AUTO: ABNORMAL /HPF
SODIUM SERPL-SCNC: 140 MMOL/L (ref 136–145)
WBC #/AREA URNS AUTO: ABNORMAL /HPF

## 2024-04-26 PROCEDURE — 83970 ASSAY OF PARATHORMONE: CPT

## 2024-04-26 PROCEDURE — 81001 URINALYSIS AUTO W/SCOPE: CPT

## 2024-04-26 PROCEDURE — 82570 ASSAY OF URINE CREATININE: CPT

## 2024-04-26 PROCEDURE — 80048 BASIC METABOLIC PNL TOTAL CA: CPT

## 2024-04-26 PROCEDURE — 82306 VITAMIN D 25 HYDROXY: CPT

## 2024-04-26 PROCEDURE — 76770 US EXAM ABDO BACK WALL COMP: CPT | Performed by: STUDENT IN AN ORGANIZED HEALTH CARE EDUCATION/TRAINING PROGRAM

## 2024-04-26 PROCEDURE — 84156 ASSAY OF PROTEIN URINE: CPT

## 2024-04-26 PROCEDURE — 36415 COLL VENOUS BLD VENIPUNCTURE: CPT

## 2024-04-26 PROCEDURE — 76770 US EXAM ABDO BACK WALL COMP: CPT

## 2024-04-29 RX ORDER — ERGOCALCIFEROL 1.25 MG/1
50000 CAPSULE ORAL
Qty: 6 CAPSULE | Refills: 2 | Status: SHIPPED | OUTPATIENT
Start: 2024-04-29

## 2024-05-10 ENCOUNTER — OFFICE VISIT (OUTPATIENT)
Dept: NEPHROLOGY | Facility: CLINIC | Age: 79
End: 2024-05-10
Payer: MEDICARE

## 2024-05-10 VITALS — HEART RATE: 59 BPM | SYSTOLIC BLOOD PRESSURE: 134 MMHG | DIASTOLIC BLOOD PRESSURE: 50 MMHG

## 2024-05-10 DIAGNOSIS — E21.3 HYPERPARATHYROIDISM (MULTI): ICD-10-CM

## 2024-05-10 DIAGNOSIS — I10 ESSENTIAL HYPERTENSION: ICD-10-CM

## 2024-05-10 DIAGNOSIS — N20.0 NEPHROLITHIASIS: ICD-10-CM

## 2024-05-10 DIAGNOSIS — E08.22 DIABETES MELLITUS DUE TO UNDERLYING CONDITION WITH DIABETIC CHRONIC KIDNEY DISEASE, UNSPECIFIED CKD STAGE, UNSPECIFIED WHETHER LONG TERM INSULIN USE (MULTI): ICD-10-CM

## 2024-05-10 DIAGNOSIS — E55.9 VITAMIN D DEFICIENCY: ICD-10-CM

## 2024-05-10 DIAGNOSIS — N18.32 STAGE 3B CHRONIC KIDNEY DISEASE (MULTI): Primary | ICD-10-CM

## 2024-05-10 PROCEDURE — 3075F SYST BP GE 130 - 139MM HG: CPT | Performed by: INTERNAL MEDICINE

## 2024-05-10 PROCEDURE — 3078F DIAST BP <80 MM HG: CPT | Performed by: INTERNAL MEDICINE

## 2024-05-10 PROCEDURE — 1160F RVW MEDS BY RX/DR IN RCRD: CPT | Performed by: INTERNAL MEDICINE

## 2024-05-10 PROCEDURE — 99214 OFFICE O/P EST MOD 30 MIN: CPT | Performed by: INTERNAL MEDICINE

## 2024-05-10 PROCEDURE — 1036F TOBACCO NON-USER: CPT | Performed by: INTERNAL MEDICINE

## 2024-05-10 PROCEDURE — 1159F MED LIST DOCD IN RCRD: CPT | Performed by: INTERNAL MEDICINE

## 2024-05-10 NOTE — PROGRESS NOTES
Servando Connelldanilo   79 y.o.    @@  [facility]/Room: [id number]/Room/bed info not found    Subjective:   The patient is being seen for a routine clinic follow-up of chronic kidney disease. Recently, the disease has been stable. Disease complications:  No hyperkalemia, no hypocalcemia, no hyperphosphatemia, no metabolic acidosis, no coagulopathy, no uremic encephalopathy, no neuropathy and no renal osteodystrophy. The patient is currently asymptomatic. No associated symptoms are reported.       Meds:   Current Outpatient Medications   Medication Sig Dispense Refill    albuterol 90 mcg/actuation inhaler Inhale 2 puffs every 4 hours if needed.      atorvastatin (Lipitor) 40 mg tablet Take 1 tablet (40 mg) by mouth once daily.      carvedilol CR (Coreg CR) 80 mg 24 hr capsule TAKE 1 CAPSULE DAILY 90 capsule 3    clobetasol (Temovate) 0.05 % cream       clobetasol (Temovate) 0.05 % external solution       clobetasoL 0.05 % shampoo       dofetilide (Tikosyn) 125 mcg capsule Take 1 capsule (125 mcg) by mouth 2 times a day. 180 capsule 1    ergocalciferol (Vitamin D-2) 1.25 MG (23435 UT) capsule Take 1 capsule (50,000 Units) by mouth every 14 (fourteen) days. 6 capsule 2    furosemide (Lasix) 40 mg tablet TAKE 1 TABLET TWICE A DAY (Patient taking differently: Take 1 tablet (40 mg) by mouth once daily.) 180 tablet 1    HumaLOG KwikPen Insulin 100 unit/mL injection INJECT 8 UNITS BEFORE MEALS PLUS SLIDING SCALE 45 mL 1    insulin glargine (Lantus Solostar U-100 Insulin) 100 unit/mL (3 mL) pen Inject 18 Units under the skin once daily. 30 mL 1    magnesium oxide (Mag-Ox) 400 mg (241.3 mg magnesium) tablet Take 1 tablet (400 mg) by mouth once daily.      metFORMIN (Glucophage) 1,000 mg tablet TAKE 1 TABLET DAILY 90 tablet 1    potassium chloride CR 10 mEq ER tablet Take 1 tablet (10 mEq) by mouth once daily.      ramipril (Altace) 10 mg capsule Take 1 capsule (10 mg) by mouth once daily. 90 capsule 1    Spiriva Respimat 2.5 mcg/actuation  inhaler Inhale 2 puffs once daily.      warfarin (Coumadin) 2.5 mg tablet TAKE 1 TO 3 TABLETS DAILY OR AS DIRECTED BY [facility] 270 tablet 1     No current facility-administered medications for this visit.          ROS:  The patient is awake and oriented. No dizziness or lightheadedness. No chills and no fever. No headaches. No nausea and no vomiting. No shortness of breath. No cough. No sputum. No chest pain. No chest tightness. No abdominal pain. No diarrhea and no constipation. No hematemesis or hemoptysis. No hematuria. No rectal bleeding. No melena. No epistaxis. No urinary symptoms. No flank pain. No leg edema. No leg pain. No weakness. No itching. Overall, the rest of the review of systems is also negative.  12 point review of systems otherwise negative as stated in HPI.        Physical Examination:        Vitals:    05/10/24 1130   BP: 134/50   Pulse: 59     General: The patient is awake, oriented, and is not in any distress.  Head and Neck: Normocephalic. No periorbital edema.  Eyes: non-icteric  Respiratory: Symmetric air entry. Symmetric chest expansion.No respiratory distress.  Cardiovascular: Symmetric peripheral pulses.  Skin: No maculopapular rash.  Abdomen: soft, nt/nd  Musculoskeletal: No peripheral edema in both left and right upper extremities.  No edema in either left or right lower extremities.  Neuro Exam: Speech is fluent. Moves extremities.    Imaging:  === 04/26/24 ===    US RENAL COMPLETE    - Impression -  No hydronephrosis. Nonobstructing right nephrolithiasis (3-4 mm).    MACRO:  None    Signed by: Laura Carson 4/28/2024 1:02 PM  Dictation workstation:   [id number]       Blood Labs:  No results found for this or any previous visit (from the past 24 hour(s)).   Lab Results   Component Value Date    .1 (H) 04/26/2024      Lab Results   Component Value Date    GLUCOSE 85 04/26/2024    CALCIUM 8.7 04/26/2024     04/26/2024    K 4.8 04/26/2024    CO2 25 04/26/2024      04/26/2024    BUN 35 (H) 04/26/2024    CREATININE 1.68 (H) 04/26/2024         Assessment and Plan:    #1 chronic kidney disease stage III.  Last creatinine was 1.68.  No proteinuria.  No microscopic hematuria.  Normal potassium and bicarb level.    2.  Hypertension.  Blood pressure is under control.  Continue the current medications.     3.  Diabetes.  Proteinuria based on spot urine protein to creatinine ratio.    4.  Secondary hyperparathyroidism.  With vitamin D deficiency.  I put him on ergocalciferol.    5.  Nephrolithiasis.  He has extreme limited stone in right kidney.  I referred him to urologist.    I will see him in about 4 months for follow-up.        Toribio Sosa MD  Senior Attending Physician  Director of Onco-Nephrology Program  Division of Nephrology & Hypertension  [facility]

## 2024-05-14 ENCOUNTER — LAB (OUTPATIENT)
Dept: LAB | Facility: LAB | Age: 79
End: 2024-05-14
Payer: MEDICARE

## 2024-05-14 DIAGNOSIS — R60.0 BILATERAL LEG EDEMA: ICD-10-CM

## 2024-05-14 DIAGNOSIS — E08.22 DIABETES MELLITUS DUE TO UNDERLYING CONDITION WITH DIABETIC CHRONIC KIDNEY DISEASE, UNSPECIFIED CKD STAGE, UNSPECIFIED WHETHER LONG TERM INSULIN USE (MULTI): ICD-10-CM

## 2024-05-14 DIAGNOSIS — I10 ESSENTIAL HYPERTENSION: ICD-10-CM

## 2024-05-14 DIAGNOSIS — N18.32 STAGE 3B CHRONIC KIDNEY DISEASE (MULTI): ICD-10-CM

## 2024-05-14 DIAGNOSIS — E55.9 VITAMIN D DEFICIENCY: ICD-10-CM

## 2024-05-14 DIAGNOSIS — E21.3 HYPERPARATHYROIDISM (MULTI): ICD-10-CM

## 2024-05-14 DIAGNOSIS — N20.0 NEPHROLITHIASIS: ICD-10-CM

## 2024-05-14 LAB
25(OH)D3 SERPL-MCNC: 28 NG/ML (ref 30–100)
ANION GAP SERPL CALC-SCNC: 14 MMOL/L (ref 10–20)
BUN SERPL-MCNC: 39 MG/DL (ref 6–23)
CALCIUM SERPL-MCNC: 8.7 MG/DL (ref 8.6–10.3)
CHLORIDE SERPL-SCNC: 106 MMOL/L (ref 98–107)
CO2 SERPL-SCNC: 26 MMOL/L (ref 21–32)
CREAT SERPL-MCNC: 1.85 MG/DL (ref 0.5–1.3)
EGFRCR SERPLBLD CKD-EPI 2021: 37 ML/MIN/1.73M*2
GLUCOSE SERPL-MCNC: 182 MG/DL (ref 74–99)
POTASSIUM SERPL-SCNC: 4.7 MMOL/L (ref 3.5–5.3)
PTH-INTACT SERPL-MCNC: 96.3 PG/ML (ref 18.5–88)
SODIUM SERPL-SCNC: 141 MMOL/L (ref 136–145)

## 2024-05-14 PROCEDURE — 80048 BASIC METABOLIC PNL TOTAL CA: CPT

## 2024-05-14 PROCEDURE — 36415 COLL VENOUS BLD VENIPUNCTURE: CPT

## 2024-05-14 PROCEDURE — 82306 VITAMIN D 25 HYDROXY: CPT

## 2024-05-14 PROCEDURE — 83970 ASSAY OF PARATHORMONE: CPT

## 2024-05-15 RX ORDER — ERGOCALCIFEROL 1.25 MG/1
50000 CAPSULE ORAL
Qty: 6 CAPSULE | Refills: 2 | Status: SHIPPED | OUTPATIENT
Start: 2024-05-15

## 2024-05-17 RX ORDER — FUROSEMIDE 40 MG/1
40 TABLET ORAL DAILY
Qty: 90 TABLET | Refills: 1 | Status: SHIPPED | OUTPATIENT
Start: 2024-05-17 | End: 2024-11-13

## 2024-05-21 ENCOUNTER — ANTICOAGULATION - WARFARIN VISIT (OUTPATIENT)
Dept: CARDIOLOGY | Facility: CLINIC | Age: 79
End: 2024-05-21

## 2024-05-21 ENCOUNTER — LAB (OUTPATIENT)
Dept: LAB | Facility: LAB | Age: 79
End: 2024-05-21
Payer: MEDICARE

## 2024-05-21 DIAGNOSIS — I48.0 PAROXYSMAL ATRIAL FIBRILLATION (MULTI): ICD-10-CM

## 2024-05-21 LAB
INR PPP: 2.6 (ref 0.9–1.1)
PROTHROMBIN TIME: 29.9 SECONDS (ref 9.8–12.8)

## 2024-05-21 PROCEDURE — 85610 PROTHROMBIN TIME: CPT

## 2024-05-21 PROCEDURE — 36415 COLL VENOUS BLD VENIPUNCTURE: CPT

## 2024-05-23 DIAGNOSIS — I10 ESSENTIAL HYPERTENSION, BENIGN: Primary | ICD-10-CM

## 2024-05-24 RX ORDER — POTASSIUM CHLORIDE 750 MG/1
10 TABLET, EXTENDED RELEASE ORAL DAILY
Qty: 90 TABLET | Refills: 3 | Status: SHIPPED | OUTPATIENT
Start: 2024-05-24

## 2024-06-05 ENCOUNTER — TELEPHONE (OUTPATIENT)
Dept: ENDOCRINOLOGY | Facility: CLINIC | Age: 79
End: 2024-06-05
Payer: MEDICARE

## 2024-06-05 DIAGNOSIS — Z79.4 TYPE 2 DIABETES MELLITUS WITH DIABETIC POLYNEUROPATHY, WITH LONG-TERM CURRENT USE OF INSULIN (MULTI): Primary | ICD-10-CM

## 2024-06-05 DIAGNOSIS — E11.42 TYPE 2 DIABETES MELLITUS WITH DIABETIC POLYNEUROPATHY, WITH LONG-TERM CURRENT USE OF INSULIN (MULTI): Primary | ICD-10-CM

## 2024-06-05 RX ORDER — FLASH GLUCOSE SCANNING READER
EACH MISCELLANEOUS
Qty: 1 EACH | Refills: 0 | Status: SHIPPED | OUTPATIENT
Start: 2024-06-05

## 2024-06-05 RX ORDER — FLASH GLUCOSE SENSOR
KIT MISCELLANEOUS
Qty: 6 EACH | Refills: 3 | Status: SHIPPED | OUTPATIENT
Start: 2024-06-05

## 2024-06-05 NOTE — TELEPHONE ENCOUNTER
"Patient called in requesting a new rx be sent in for a replacement of a Needs to say, \"damaged\" Freestyle fernanda reader and sensors. ASAP  "

## 2024-06-18 ENCOUNTER — LAB (OUTPATIENT)
Dept: LAB | Facility: LAB | Age: 79
End: 2024-06-18
Payer: MEDICARE

## 2024-06-18 ENCOUNTER — ANTICOAGULATION - WARFARIN VISIT (OUTPATIENT)
Dept: CARDIOLOGY | Facility: CLINIC | Age: 79
End: 2024-06-18

## 2024-06-18 DIAGNOSIS — I48.0 PAROXYSMAL ATRIAL FIBRILLATION (MULTI): ICD-10-CM

## 2024-06-18 LAB
INR PPP: 1.9 (ref 0.9–1.1)
PROTHROMBIN TIME: 21.5 SECONDS (ref 9.8–12.8)

## 2024-06-18 PROCEDURE — 36415 COLL VENOUS BLD VENIPUNCTURE: CPT

## 2024-06-18 PROCEDURE — 85610 PROTHROMBIN TIME: CPT

## 2024-07-03 ENCOUNTER — LAB (OUTPATIENT)
Dept: LAB | Facility: LAB | Age: 79
End: 2024-07-03
Payer: MEDICARE

## 2024-07-03 ENCOUNTER — APPOINTMENT (OUTPATIENT)
Dept: UROLOGY | Facility: CLINIC | Age: 79
End: 2024-07-03
Payer: MEDICARE

## 2024-07-03 ENCOUNTER — ANTICOAGULATION - WARFARIN VISIT (OUTPATIENT)
Dept: CARDIOLOGY | Facility: CLINIC | Age: 79
End: 2024-07-03

## 2024-07-03 DIAGNOSIS — I48.0 PAROXYSMAL ATRIAL FIBRILLATION (MULTI): ICD-10-CM

## 2024-07-03 LAB
INR PPP: 2.8 (ref 0.9–1.1)
PROTHROMBIN TIME: 32.2 SECONDS (ref 9.8–12.8)

## 2024-07-03 PROCEDURE — 36415 COLL VENOUS BLD VENIPUNCTURE: CPT

## 2024-07-03 PROCEDURE — 85610 PROTHROMBIN TIME: CPT

## 2024-07-17 ENCOUNTER — ANTICOAGULATION - WARFARIN VISIT (OUTPATIENT)
Dept: CARDIOLOGY | Facility: CLINIC | Age: 79
End: 2024-07-17

## 2024-07-17 ENCOUNTER — LAB (OUTPATIENT)
Dept: LAB | Facility: LAB | Age: 79
End: 2024-07-17
Payer: MEDICARE

## 2024-07-17 DIAGNOSIS — I48.0 PAROXYSMAL ATRIAL FIBRILLATION (MULTI): ICD-10-CM

## 2024-07-17 LAB
INR PPP: 1.8 (ref 0.9–1.1)
PROTHROMBIN TIME: 20 SECONDS (ref 9.8–12.8)

## 2024-07-17 PROCEDURE — 85610 PROTHROMBIN TIME: CPT

## 2024-07-17 PROCEDURE — 36415 COLL VENOUS BLD VENIPUNCTURE: CPT

## 2024-07-23 ENCOUNTER — APPOINTMENT (OUTPATIENT)
Dept: ENDOCRINOLOGY | Facility: CLINIC | Age: 79
End: 2024-07-23
Payer: MEDICARE

## 2024-07-23 VITALS
SYSTOLIC BLOOD PRESSURE: 148 MMHG | DIASTOLIC BLOOD PRESSURE: 60 MMHG | BODY MASS INDEX: 28.71 KG/M2 | WEIGHT: 212 LBS | HEIGHT: 72 IN

## 2024-07-23 DIAGNOSIS — E11.42 TYPE 2 DIABETES MELLITUS WITH DIABETIC POLYNEUROPATHY, WITH LONG-TERM CURRENT USE OF INSULIN (MULTI): Primary | ICD-10-CM

## 2024-07-23 DIAGNOSIS — E78.5 HYPERLIPIDEMIA, UNSPECIFIED HYPERLIPIDEMIA TYPE: ICD-10-CM

## 2024-07-23 DIAGNOSIS — Z97.8 USES SELF-APPLIED CONTINUOUS GLUCOSE MONITORING DEVICE: ICD-10-CM

## 2024-07-23 DIAGNOSIS — Z79.4 TYPE 2 DIABETES MELLITUS WITH DIABETIC POLYNEUROPATHY, WITH LONG-TERM CURRENT USE OF INSULIN (MULTI): Primary | ICD-10-CM

## 2024-07-23 LAB
POC FINGERSTICK BLOOD GLUCOSE: 203 MG/DL (ref 70–100)
POC HEMOGLOBIN A1C: 7.6 % (ref 4.2–6.5)

## 2024-07-23 PROCEDURE — 3078F DIAST BP <80 MM HG: CPT | Performed by: STUDENT IN AN ORGANIZED HEALTH CARE EDUCATION/TRAINING PROGRAM

## 2024-07-23 PROCEDURE — 95251 CONT GLUC MNTR ANALYSIS I&R: CPT | Performed by: STUDENT IN AN ORGANIZED HEALTH CARE EDUCATION/TRAINING PROGRAM

## 2024-07-23 PROCEDURE — 1159F MED LIST DOCD IN RCRD: CPT | Performed by: STUDENT IN AN ORGANIZED HEALTH CARE EDUCATION/TRAINING PROGRAM

## 2024-07-23 PROCEDURE — 83036 HEMOGLOBIN GLYCOSYLATED A1C: CPT | Performed by: STUDENT IN AN ORGANIZED HEALTH CARE EDUCATION/TRAINING PROGRAM

## 2024-07-23 PROCEDURE — 82962 GLUCOSE BLOOD TEST: CPT | Performed by: STUDENT IN AN ORGANIZED HEALTH CARE EDUCATION/TRAINING PROGRAM

## 2024-07-23 PROCEDURE — 99214 OFFICE O/P EST MOD 30 MIN: CPT | Performed by: STUDENT IN AN ORGANIZED HEALTH CARE EDUCATION/TRAINING PROGRAM

## 2024-07-23 PROCEDURE — 3077F SYST BP >= 140 MM HG: CPT | Performed by: STUDENT IN AN ORGANIZED HEALTH CARE EDUCATION/TRAINING PROGRAM

## 2024-07-23 NOTE — PROGRESS NOTES
Subjective   Patient ID: Servando Wells is a 79 y.o. male who presents for Diabetes (Servando Wells is a 78 y.o. male who presents for Diabetes (DX dm: >15 years ago /PCP: Puma Mahan /Podiatry: neuropathy-sees  /Eye exam: 1.5 years ago, does have apt soon /Pt testing glucose 4 times daily with freestyle fernanda (original) - READER. Due to fluctuating blood glucose, hypoglycemia and 4 insulin injections. Adjusting meal time insulin based on glucose; compliant with dm regimen.Pt is adhering and benefiting from CGM treatment plan)   Lab Results   Component Value Date    HGBA1C 7.6 (A) 07/23/2024      HPI  A 79 yr old male, with hx of DM II, well controlled, complicated by neuropathy, presented for follow up.     A1c is   7.6 was 7.4 , 6.8 was      I personally reviewed CGM downloads  -Very high: 9 %  -High 19%  -Target range 61 %  -Low 9 %  -Very low  2%     He states BG are very inaccurtae and vary widely within a few moinutes , We discussed sensor insertion and avoiding using it in muscular areas         Review of Systems    Objective   Physical Exam  Constitutional:       Appearance: Normal appearance.   Cardiovascular:      Rate and Rhythm: Normal rate and regular rhythm.   Pulmonary:      Effort: Pulmonary effort is normal.      Breath sounds: Normal breath sounds.   Neurological:      Mental Status: He is alert.   Psychiatric:         Mood and Affect: Mood normal.      Visit Vitals  /60   Ht 1.829 m (6')   Wt 96.2 kg (212 lb)   BMI 28.75 kg/m²   Smoking Status Former   BSA 2.21 m²        Assessment/Plan        Type II DM, well controlled, on MDI A1c  7.6 was 7.4  ,  on  MDI and Metformin, complicated  with diabetic neuropathy ( s/p laser treatment ) and hypoglycemia.   HLD well controlled on atorvasatin 40 mg   HTN controlled on ACE         Plan:  - Advised to not use Humalog before bedtime to prevent hypoglycemia   - continue  metformin a total of 1000 mg  daily GFR 42   - continue Lantus to 18  units at night ( currently taking dose between 18-22 units)  -increase  humlaog to 8  units with meals, plus sliding scale SS2      RTC in 3-4 months

## 2024-07-26 ENCOUNTER — HOSPITAL ENCOUNTER (OUTPATIENT)
Dept: RADIOLOGY | Facility: HOSPITAL | Age: 79
Discharge: HOME | End: 2024-07-26
Payer: MEDICARE

## 2024-07-26 DIAGNOSIS — J98.4 OTHER DISORDERS OF LUNG: ICD-10-CM

## 2024-07-26 PROCEDURE — 71250 CT THORAX DX C-: CPT

## 2024-07-29 DIAGNOSIS — I48.0 PAROXYSMAL ATRIAL FIBRILLATION (MULTI): ICD-10-CM

## 2024-07-29 PROBLEM — R94.31 ABNORMAL EKG: Status: RESOLVED | Noted: 2023-12-07 | Resolved: 2024-07-29

## 2024-07-29 PROBLEM — Z79.4 TYPE 2 DIABETES MELLITUS WITH DIABETIC NEUROPATHY, WITH LONG-TERM CURRENT USE OF INSULIN (MULTI): Status: ACTIVE | Noted: 2023-12-07

## 2024-07-29 NOTE — PROGRESS NOTES
Subjective   Reason for Visit: Servando Wells is an 79 y.o. male here for a Medicare Wellness visit.     Past Medical, Surgical, and Family History reviewed and updated in chart.  In the past 2 weeks this patient has not felt down, depressed, hopeless, lost interest or pleasure in doing things or thought of harming herself or others and this was discussed over five minutes. The patient has not fallen in the last six months and has no loose rugs,  uneven floors or poor lighting at home.  Advance Care Planning discussion was held over 5 minutes.  The patient has no spiritual/Scientologist/cultural values or needs that we need to know. The patient does not feel threatened or abused physically, emotionally or sexually.  The patient feels safe in the home.  In the past month, there was not a day when the patient of anyone in the patient's family went hungry because there was not enough food.  The patient denies that they or the person with them has problems with hearing, speaking, reading, moving around or learning.  The patient states they are comfortable filling out medical forms. Alcohol usage was dicussed over five minutes.    Reviewed all medications by prescribing practitioner or clinical pharmacist (such as prescriptions, OTCs, herbal therapies and supplements) and documented in the medical record.    HPI  Taking meds as directed without tolerability or affordability issues; no complaints today.    Patient Care Team:  Puma Mahan MD as PCP - General (Family Medicine)  Conrad Lincoln MD as PCP - [facility] ACO Attributed Provider  Jennifer Dyson MD as Cardiologist (Cardiology)  Toribio Sosa MD as Nephrologist (Nephrology)  Negrito Burris DPM (Podiatry)  Conrad Lincoln MD as Referring Physician (Endocrinology)  Lili Edge MD as Referring Physician (Dermatology)  Chidi Colbert MD (Pulmonary Disease)     Review of Systems  General-denies weakness or myalgias; no unexplained fever or chills  Opth-no  dry eyes, itchy eyes or blurry vision  ENT-No tinnitus or vertigo;  hearing loss persists despite $4,000 hearing aids  Neck-no unexplained swelling in neck or pain    Objective   Vitals:  /63 (BP Location: Left arm, Patient Position: Sitting)   Temp 36.8 °C (98.3 °F) (Oral)       Physical Exam  General- well defined, well nourished, well hydrated individual in NAD  Head-normocephalic without masses or tenderness  Eyes-pupils equal round, reactive to light and accommodation, fundi with normal cup/disc ratio, conjunctiva without redness or discharge  Ears-normal pinnae, and canals, with bilateral hearing aids  Nose-septum in the midline, normal mucosa bilaterally  Throat- without erythema or exudate, uvula in midline; edentulous  Neck-supple without lymphadenopathy or thyromegaly; no carotid bruits  Heart- regular rate and rhythm, normal s1 and s2 without murmur or gallop;  no edema  Lungs-clear to auscultation  Abdomen-soft, positive bowel sounds, without masses, HSmegaly or pain  Foot Exam-normal propioceptive, decreased vibration to his knees, and monofilament decreased to mid foot bilaterally;  normal pulses, temperature; no hair distribution, skin dry and normal color         Assessment/Plan   Problem List Items Addressed This Visit       Asymmetric SNHL (sensorineural hearing loss)    Current Assessment & Plan     Needs hearing screen in 2025         Cervical disc disorder with myelopathy, cervicothoracic region    Type 2 diabetes mellitus with diabetic neuropathy, with long-term current use of insulin (Multi) - Primary    Heart failure with preserved ejection fraction, borderline, class III (Multi)    Relevant Orders    Transthoracic Echo Complete    Hyperlipidemia    Relevant Orders    POCT Lipid Panel manually resulted (Completed)    Hypertensive heart disease    Malignant neoplasm of urinary bladder (Multi)    Current Assessment & Plan     Dr Burch         Skin ulcer of left ankle, limited to  breakdown of skin (Multi)    Current Assessment & Plan     Dr Lili Edge          Other Visit Diagnoses       Hypertensive heart and chronic kidney disease with heart failure and stage 1 through stage 4 chronic kidney disease, or unspecified chronic kidney disease (Multi)        Relevant Orders    Transthoracic Echo Complete                 Please provide us a copy of your Living Will and/or the Durable Power of  for Healthcare for your file.     Consider getting the True Glow by [employer] Paraffin Wax Machine for Hand and Feet - Paraffin Bath for Hands - Includes 1lb. Moisturizing Paraffin Wax on Amazon for your hand  pain.    Check your feet daily for sores or cuts since you can't feel them as well as you used to.    See your eye doctor for a dilated eye exam at least every year to screen for the gradual loss of vision that can occur with Glaucoma and the potentially catastrophic effects of Diabetes.    Follow up fasting (no alcohol for 48 hours and just water for 14 hours) in six months for your next routine appointment.  In general, take any medications on schedule (except for types of Insulin).

## 2024-07-29 NOTE — PATIENT INSTRUCTIONS
Please provide us a copy of your Living Will and/or the Durable Power of  for Healthcare for your file.     Consider getting the True Glow by [employer] Paraffin Wax Machine for Hand and Feet - Paraffin Bath for Hands - Includes 1lb. Moisturizing Paraffin Wax on Amazon for your hand  pain.    Check your feet daily for sores or cuts since you can't feel them as well as you used to.    See your eye doctor for a dilated eye exam at least every year to screen for the gradual loss of vision that can occur with Glaucoma and the potentially catastrophic effects of Diabetes.    Follow up fasting (no alcohol for 48 hours and just water for 14 hours) in six months for your next routine appointment.  In general, take any medications on schedule (except for types of Insulin).

## 2024-07-30 ENCOUNTER — APPOINTMENT (OUTPATIENT)
Dept: PRIMARY CARE | Facility: CLINIC | Age: 79
End: 2024-07-30
Payer: MEDICARE

## 2024-07-30 VITALS — SYSTOLIC BLOOD PRESSURE: 140 MMHG | DIASTOLIC BLOOD PRESSURE: 63 MMHG | TEMPERATURE: 98.3 F

## 2024-07-30 DIAGNOSIS — Z79.4 TYPE 2 DIABETES MELLITUS WITH DIABETIC NEUROPATHY, WITH LONG-TERM CURRENT USE OF INSULIN (MULTI): Primary | ICD-10-CM

## 2024-07-30 DIAGNOSIS — M50.03 CERVICAL DISC DISORDER WITH MYELOPATHY, CERVICOTHORACIC REGION: ICD-10-CM

## 2024-07-30 DIAGNOSIS — L97.321 SKIN ULCER OF LEFT ANKLE, LIMITED TO BREAKDOWN OF SKIN (MULTI): ICD-10-CM

## 2024-07-30 DIAGNOSIS — I50.30: ICD-10-CM

## 2024-07-30 DIAGNOSIS — E78.2 MIXED HYPERLIPIDEMIA: ICD-10-CM

## 2024-07-30 DIAGNOSIS — H90.3 ASYMMETRIC SNHL (SENSORINEURAL HEARING LOSS): ICD-10-CM

## 2024-07-30 DIAGNOSIS — C67.9 MALIGNANT NEOPLASM OF URINARY BLADDER, UNSPECIFIED SITE (MULTI): ICD-10-CM

## 2024-07-30 DIAGNOSIS — I11.9 HYPERTENSIVE HEART DISEASE, UNSPECIFIED WHETHER HEART FAILURE PRESENT: ICD-10-CM

## 2024-07-30 DIAGNOSIS — I13.0 HYPERTENSIVE HEART AND CHRONIC KIDNEY DISEASE WITH HEART FAILURE AND STAGE 1 THROUGH STAGE 4 CHRONIC KIDNEY DISEASE, OR UNSPECIFIED CHRONIC KIDNEY DISEASE (MULTI): ICD-10-CM

## 2024-07-30 DIAGNOSIS — E11.40 TYPE 2 DIABETES MELLITUS WITH DIABETIC NEUROPATHY, WITH LONG-TERM CURRENT USE OF INSULIN (MULTI): Primary | ICD-10-CM

## 2024-07-30 PROBLEM — L97.322: Status: ACTIVE | Noted: 2024-07-30

## 2024-07-30 LAB
POC HDL CHOLESTEROL: 41 MG/DL (ref 0–40)
POC LDL CHOLESTEROL: 45 MG/DL (ref 0–100)
POC NON-HDL CHOLESTEROL: 66 MG/DL (ref 0–130)
POC TOTAL CHOLESTEROL/HDL RATIO: 2.6 (ref 0–4.5)
POC TOTAL CHOLESTEROL: 108 MG/DL (ref 0–199)
POC TRIGLYCERIDES: 109 MG/DL (ref 0–150)

## 2024-07-30 PROCEDURE — 1170F FXNL STATUS ASSESSED: CPT | Performed by: FAMILY MEDICINE

## 2024-07-30 PROCEDURE — G0442 ANNUAL ALCOHOL SCREEN 15 MIN: HCPCS | Performed by: FAMILY MEDICINE

## 2024-07-30 PROCEDURE — 1160F RVW MEDS BY RX/DR IN RCRD: CPT | Performed by: FAMILY MEDICINE

## 2024-07-30 PROCEDURE — G0439 PPPS, SUBSEQ VISIT: HCPCS | Performed by: FAMILY MEDICINE

## 2024-07-30 PROCEDURE — 99214 OFFICE O/P EST MOD 30 MIN: CPT | Performed by: FAMILY MEDICINE

## 2024-07-30 PROCEDURE — G0444 DEPRESSION SCREEN ANNUAL: HCPCS | Performed by: FAMILY MEDICINE

## 2024-07-30 PROCEDURE — 80061 LIPID PANEL: CPT | Performed by: FAMILY MEDICINE

## 2024-07-30 PROCEDURE — 1123F ACP DISCUSS/DSCN MKR DOCD: CPT | Performed by: FAMILY MEDICINE

## 2024-07-30 PROCEDURE — 1159F MED LIST DOCD IN RCRD: CPT | Performed by: FAMILY MEDICINE

## 2024-07-30 ASSESSMENT — ACTIVITIES OF DAILY LIVING (ADL)
MANAGING_FINANCES: INDEPENDENT
GROCERY_SHOPPING: INDEPENDENT
DOING_HOUSEWORK: INDEPENDENT
BATHING: INDEPENDENT
TAKING_MEDICATION: INDEPENDENT
DRESSING: INDEPENDENT

## 2024-07-30 ASSESSMENT — ENCOUNTER SYMPTOMS
DEPRESSION: 0
OCCASIONAL FEELINGS OF UNSTEADINESS: 1
LOSS OF SENSATION IN FEET: 1

## 2024-07-30 ASSESSMENT — PATIENT HEALTH QUESTIONNAIRE - PHQ9
2. FEELING DOWN, DEPRESSED OR HOPELESS: NOT AT ALL
SUM OF ALL RESPONSES TO PHQ9 QUESTIONS 1 AND 2: 0
1. LITTLE INTEREST OR PLEASURE IN DOING THINGS: NOT AT ALL

## 2024-08-01 RX ORDER — WARFARIN 2.5 MG/1
TABLET ORAL
Qty: 270 TABLET | Refills: 1 | Status: SHIPPED | OUTPATIENT
Start: 2024-08-01

## 2024-08-02 ENCOUNTER — LAB (OUTPATIENT)
Dept: LAB | Facility: LAB | Age: 79
End: 2024-08-02
Payer: MEDICARE

## 2024-08-02 ENCOUNTER — ANTICOAGULATION - WARFARIN VISIT (OUTPATIENT)
Dept: CARDIOLOGY | Facility: CLINIC | Age: 79
End: 2024-08-02

## 2024-08-02 DIAGNOSIS — I48.0 PAROXYSMAL ATRIAL FIBRILLATION (MULTI): ICD-10-CM

## 2024-08-02 LAB
INR PPP: 2.5 (ref 0.9–1.1)
PROTHROMBIN TIME: 28.9 SECONDS (ref 9.8–12.8)

## 2024-08-02 PROCEDURE — 85610 PROTHROMBIN TIME: CPT

## 2024-08-02 PROCEDURE — 36415 COLL VENOUS BLD VENIPUNCTURE: CPT

## 2024-08-12 DIAGNOSIS — E11.42 TYPE 2 DIABETES MELLITUS WITH DIABETIC POLYNEUROPATHY, WITH LONG-TERM CURRENT USE OF INSULIN (MULTI): Primary | ICD-10-CM

## 2024-08-12 DIAGNOSIS — Z79.4 TYPE 2 DIABETES MELLITUS WITH DIABETIC POLYNEUROPATHY, WITH LONG-TERM CURRENT USE OF INSULIN (MULTI): Primary | ICD-10-CM

## 2024-08-12 RX ORDER — PEN NEEDLE, DIABETIC 31 GX5/16"
NEEDLE, DISPOSABLE MISCELLANEOUS
Qty: 400 EACH | Refills: 1 | Status: SHIPPED | OUTPATIENT
Start: 2024-08-12

## 2024-08-13 DIAGNOSIS — I48.0 PAROXYSMAL ATRIAL FIBRILLATION (MULTI): ICD-10-CM

## 2024-08-22 DIAGNOSIS — Z79.4 TYPE 2 DIABETES MELLITUS WITH DIABETIC POLYNEUROPATHY, WITH LONG-TERM CURRENT USE OF INSULIN (MULTI): ICD-10-CM

## 2024-08-22 DIAGNOSIS — E11.42 TYPE 2 DIABETES MELLITUS WITH DIABETIC POLYNEUROPATHY, WITH LONG-TERM CURRENT USE OF INSULIN (MULTI): ICD-10-CM

## 2024-08-22 RX ORDER — METFORMIN HYDROCHLORIDE 1000 MG/1
1000 TABLET ORAL DAILY
Qty: 90 TABLET | Refills: 0 | Status: SHIPPED | OUTPATIENT
Start: 2024-08-22

## 2024-09-03 ENCOUNTER — LAB (OUTPATIENT)
Dept: LAB | Facility: LAB | Age: 79
End: 2024-09-03
Payer: MEDICARE

## 2024-09-03 ENCOUNTER — ANTICOAGULATION - WARFARIN VISIT (OUTPATIENT)
Dept: CARDIOLOGY | Facility: CLINIC | Age: 79
End: 2024-09-03

## 2024-09-03 DIAGNOSIS — I48.0 PAROXYSMAL ATRIAL FIBRILLATION (MULTI): ICD-10-CM

## 2024-09-03 LAB
INR PPP: 2.6 (ref 0.9–1.1)
PROTHROMBIN TIME: 29.7 SECONDS (ref 9.8–12.8)

## 2024-09-03 PROCEDURE — 85610 PROTHROMBIN TIME: CPT

## 2024-09-03 PROCEDURE — 36415 COLL VENOUS BLD VENIPUNCTURE: CPT

## 2024-09-10 ENCOUNTER — APPOINTMENT (OUTPATIENT)
Dept: CARDIOLOGY | Facility: CLINIC | Age: 79
End: 2024-09-10
Payer: MEDICARE

## 2024-09-10 ENCOUNTER — OFFICE VISIT (OUTPATIENT)
Dept: NEPHROLOGY | Facility: CLINIC | Age: 79
End: 2024-09-10
Payer: MEDICARE

## 2024-09-10 VITALS
SYSTOLIC BLOOD PRESSURE: 178 MMHG | DIASTOLIC BLOOD PRESSURE: 72 MMHG | WEIGHT: 212 LBS | HEIGHT: 72 IN | BODY MASS INDEX: 28.71 KG/M2 | HEART RATE: 59 BPM

## 2024-09-10 VITALS
DIASTOLIC BLOOD PRESSURE: 84 MMHG | HEIGHT: 72 IN | SYSTOLIC BLOOD PRESSURE: 138 MMHG | WEIGHT: 212 LBS | BODY MASS INDEX: 28.71 KG/M2 | HEART RATE: 57 BPM

## 2024-09-10 DIAGNOSIS — I48.0 PAROXYSMAL ATRIAL FIBRILLATION (MULTI): ICD-10-CM

## 2024-09-10 DIAGNOSIS — E21.3 HYPERPARATHYROIDISM (MULTI): ICD-10-CM

## 2024-09-10 DIAGNOSIS — I11.9 HYPERTENSIVE HEART DISEASE, UNSPECIFIED WHETHER HEART FAILURE PRESENT: ICD-10-CM

## 2024-09-10 DIAGNOSIS — E78.2 MIXED HYPERLIPIDEMIA: Primary | ICD-10-CM

## 2024-09-10 DIAGNOSIS — I50.30: ICD-10-CM

## 2024-09-10 DIAGNOSIS — I10 ESSENTIAL HYPERTENSION, BENIGN: ICD-10-CM

## 2024-09-10 DIAGNOSIS — I51.89 DIASTOLIC DYSFUNCTION: Primary | ICD-10-CM

## 2024-09-10 DIAGNOSIS — N18.32 STAGE 3B CHRONIC KIDNEY DISEASE (MULTI): Primary | ICD-10-CM

## 2024-09-10 DIAGNOSIS — Z79.01 CHRONIC ANTICOAGULATION: ICD-10-CM

## 2024-09-10 DIAGNOSIS — E08.22 DIABETES MELLITUS DUE TO UNDERLYING CONDITION WITH DIABETIC CHRONIC KIDNEY DISEASE, UNSPECIFIED CKD STAGE, UNSPECIFIED WHETHER LONG TERM INSULIN USE (MULTI): ICD-10-CM

## 2024-09-10 DIAGNOSIS — Z87.891 FORMER SMOKER: ICD-10-CM

## 2024-09-10 DIAGNOSIS — E78.2 MIXED HYPERLIPIDEMIA: ICD-10-CM

## 2024-09-10 DIAGNOSIS — R60.0 BILATERAL LEG EDEMA: ICD-10-CM

## 2024-09-10 DIAGNOSIS — I45.10 RBBB: ICD-10-CM

## 2024-09-10 PROCEDURE — 1123F ACP DISCUSS/DSCN MKR DOCD: CPT | Performed by: INTERNAL MEDICINE

## 2024-09-10 PROCEDURE — 1160F RVW MEDS BY RX/DR IN RCRD: CPT | Performed by: INTERNAL MEDICINE

## 2024-09-10 PROCEDURE — 3078F DIAST BP <80 MM HG: CPT | Performed by: INTERNAL MEDICINE

## 2024-09-10 PROCEDURE — 93000 ELECTROCARDIOGRAM COMPLETE: CPT | Performed by: INTERNAL MEDICINE

## 2024-09-10 PROCEDURE — 1036F TOBACCO NON-USER: CPT | Performed by: INTERNAL MEDICINE

## 2024-09-10 PROCEDURE — 99214 OFFICE O/P EST MOD 30 MIN: CPT | Performed by: INTERNAL MEDICINE

## 2024-09-10 PROCEDURE — 1159F MED LIST DOCD IN RCRD: CPT | Performed by: INTERNAL MEDICINE

## 2024-09-10 PROCEDURE — 3077F SYST BP >= 140 MM HG: CPT | Performed by: INTERNAL MEDICINE

## 2024-09-10 RX ORDER — BLOOD-GLUCOSE,RECEIVER,CONT
1 EACH MISCELLANEOUS AS NEEDED
COMMUNITY

## 2024-09-10 RX ORDER — RAMIPRIL 10 MG/1
10 CAPSULE ORAL DAILY
Qty: 90 CAPSULE | Refills: 3 | Status: SHIPPED | OUTPATIENT
Start: 2024-09-10 | End: 2025-09-10

## 2024-09-10 RX ORDER — AMLODIPINE BESYLATE 5 MG/1
5 TABLET ORAL DAILY
Qty: 90 TABLET | Refills: 3 | Status: SHIPPED | OUTPATIENT
Start: 2024-09-10 | End: 2024-09-10 | Stop reason: ALTCHOICE

## 2024-09-10 ASSESSMENT — ENCOUNTER SYMPTOMS
DYSURIA: 0
LIGHT-HEADEDNESS: 0
ARTHRALGIAS: 1
HEMATURIA: 0
GASTROINTESTINAL NEGATIVE: 1
PSYCHIATRIC NEGATIVE: 1
PALPITATIONS: 0
DIZZINESS: 0
BACK PAIN: 1
FREQUENCY: 1
ACTIVITY CHANGE: 1

## 2024-09-10 NOTE — PROGRESS NOTES
Servando Renteriaporshadanilo   79 y.o.    @@  [facility]/Room: [id number]/Room/bed info not found    Subjective:   The patient is being seen for a routine clinic follow-up of chronic kidney disease. Recently, the disease has been stable. Disease complications:  No hyperkalemia, no hypocalcemia, no hyperphosphatemia, no metabolic acidosis, no coagulopathy, no uremic encephalopathy, no neuropathy and no renal osteodystrophy. The patient is currently asymptomatic. No associated symptoms are reported.       Meds:   Current Outpatient Medications   Medication Sig Dispense Refill    albuterol 90 mcg/actuation inhaler Inhale 2 puffs every 4 hours if needed.      atorvastatin (Lipitor) 40 mg tablet Take 1 tablet (40 mg) by mouth once daily.      carvedilol CR (Coreg CR) 80 mg 24 hr capsule TAKE 1 CAPSULE DAILY 90 capsule 3    clobetasol (Temovate) 0.05 % cream       clobetasol (Temovate) 0.05 % external solution       clobetasoL 0.05 % shampoo       dofetilide (Tikosyn) 125 mcg capsule Take 1 capsule (125 mcg) by mouth 2 times a day. 180 capsule 1    ergocalciferol (Vitamin D-2) 1.25 MG (61376 UT) capsule Take 1 capsule (50,000 Units) by mouth every 14 (fourteen) days. 6 capsule 2    ergocalciferol (Vitamin D-2) 1.25 MG (92727 UT) capsule Take 1 capsule (50,000 Units) by mouth every 14 (fourteen) days. 6 capsule 2    flash glucose scanning reader (FreeStyle Cristina 2 [place]) misc Use as instructed 1 each 0    flash glucose sensor kit (FreeStyle Cristina 2 Sensor) kit Use as instructed 6 each 3    furosemide (Lasix) 40 mg tablet Take 1 tablet (40 mg) by mouth once daily. 90 tablet 1    HumaLOG KwikPen Insulin 100 unit/mL injection INJECT 8 UNITS BEFORE MEALS PLUS SLIDING SCALE 45 mL 1    insulin glargine (Lantus Solostar U-100 Insulin) 100 unit/mL (3 mL) pen Inject 18 Units under the skin once daily. 30 mL 1    magnesium oxide (Mag-Ox) 400 mg (241.3 mg magnesium) tablet Take 1 tablet (400 mg) by mouth once daily.      metFORMIN (Glucophage) 1,000 mg  "tablet TAKE 1 TABLET DAILY 90 tablet 0    pen needle, diabetic (BD Ultra-Fine Short Pen Needle) 31 gauge x 5/16\" needle USE 4 PEN NEEDLES DAILY 400 each 1    potassium chloride CR 10 mEq ER tablet TAKE 1 TABLET DAILY 90 tablet 3    ramipril (Altace) 10 mg capsule Take 1 capsule (10 mg) by mouth once daily. 90 capsule 1    Spiriva Respimat 2.5 mcg/actuation inhaler Inhale 2 puffs once daily.      warfarin (Coumadin) 2.5 mg tablet TAKE 1 TO 3 TABLETS DAILY OR AS DIRECTED BY CHAVA 270 tablet 1     No current facility-administered medications for this visit.          ROS:  The patient is awake and oriented. No dizziness or lightheadedness. No chills and no fever. No headaches. No nausea and no vomiting. No shortness of breath. No cough. No sputum. No chest pain. No chest tightness. No abdominal pain. No diarrhea and no constipation. No hematemesis or hemoptysis. No hematuria. No rectal bleeding. No melena. No epistaxis. No urinary symptoms. No flank pain. No leg edema. No leg pain. No weakness. No itching. Overall, the rest of the review of systems is also negative.  12 point review of systems otherwise negative as stated in HPI.        Physical Examination:        Vitals:    09/10/24 1115   BP: 156/75   Pulse: 59     General: The patient is awake, oriented, and is not in any distress.  Head and Neck: Normocephalic. No periorbital edema.  Eyes: non-icteric  Respiratory: Symmetric air entry. Symmetric chest expansion.No respiratory distress.  Cardiovascular: Symmetric peripheral pulses.  Skin: No maculopapular rash.  Abdomen: soft, nt/nd  Musculoskeletal: No peripheral edema in both left and right upper extremities.  No edema in either left or right lower extremities.  Neuro Exam: Speech is fluent. Moves extremities.    Imaging:  === 04/26/24 ===    US RENAL COMPLETE    - Impression -  No hydronephrosis. Nonobstructing right nephrolithiasis (3-4 mm).    MACRO:  None    Signed by: Laura Carson 4/28/2024 1:02 PM  Dictation " workstation:   [id number]       Blood Labs:  No results found for this or any previous visit (from the past 24 hour(s)).   Lab Results   Component Value Date    PTH 96.3 (H) 05/14/2024      Lab Results   Component Value Date    GLUCOSE 182 (H) 05/14/2024    CALCIUM 8.7 05/14/2024     05/14/2024    K 4.7 05/14/2024    CO2 26 05/14/2024     05/14/2024    BUN 39 (H) 05/14/2024    CREATININE 1.85 (H) 05/14/2024         Assessment and Plan:  #1 chronic kidney disease stage III.  Last creatinine level from May 2024 is 1.85.  I asked for a BMP to be done today.     2.  Hypertension.  Blood pressure is high.  I added amlodipine to his medications.    3.  Diabetes.  No proteinuria based on spot urine protein to creatinine ratio.     4.  Secondary hyperparathyroidism.  With vitamin D deficiency.  He took a course of ergocalciferol.  PTH and 25-hydroxy vitamin D level to be checked today.    I will see him in about 4 months for follow-up.          Toribio Sosa MD  Senior Attending Physician  Director of Onco-Nephrology Program  Division of Nephrology & Hypertension  [facility]

## 2024-09-10 NOTE — PROGRESS NOTES
Patient:  Servando Wells  YOB: 1945  MRN: [id number]       Chief Complaint/Active Symptoms:       Servando Wells is a 79 y.o. male who returns today for cardiac follow-up.    Here for routine CV follow-up. No chest pain or discomfort. No shortness of breath, orthopnea or PND. No palpitations, no dizziness or lightheadedness.  Patient has mild lower extremity edema that he said is fairly stable.    Has difficulty ambulating and uses a cane.  Has questions as his blood pressures come down since he first arrived in the office today about whether he needs the new medication prescribed by nephrology.  He states he is on so many medications already he does not want to take it unless it is really necessary.  He states that often his blood pressure is higher at first and comes down later.    Review of Systems   Constitutional:  Positive for activity change.   Cardiovascular:  Positive for leg swelling. Negative for chest pain and palpitations.   Gastrointestinal: Negative.    Genitourinary:  Positive for frequency. Negative for dysuria and hematuria.   Musculoskeletal:  Positive for arthralgias, back pain and gait problem.   Neurological:  Negative for dizziness and light-headedness.   Psychiatric/Behavioral: Negative.     All other systems reviewed and are negative.      Objective:     Vitals:    09/10/24 1137   BP: 138/84   Pulse: 57       Vitals:    09/10/24 1137   Weight: 96.2 kg (212 lb)       Allergies:     No Known Allergies       Medications:     Current Outpatient Medications   Medication Instructions    albuterol 90 mcg/actuation inhaler 2 puffs, inhalation, Every 4 hours PRN    atorvastatin (Lipitor) 40 mg tablet 1 tablet, oral, Daily    carvedilol CR (COREG CR) 80 mg, oral, Daily    clobetasol (Temovate) 0.05 % cream     clobetasol (Temovate) 0.05 % external solution     clobetasoL 0.05 % shampoo     dofetilide (TIKOSYN) 125 mcg, oral, 2 times daily    ergocalciferol (VITAMIN D-2) 50,000 Units, oral,  "Every 14 days    ergocalciferol (VITAMIN D-2) 50,000 Units, oral, Every 14 days    FreeStyle Cristina 3 [place] misc 1 Device, subcutaneous, As needed, Use as instructed    furosemide (LASIX) 40 mg, oral, Daily    HumaLOG KwikPen Insulin 100 unit/mL injection INJECT 8 UNITS BEFORE MEALS PLUS SLIDING SCALE    Lantus Solostar U-100 Insulin 18 Units, subcutaneous, Daily    magnesium oxide (Mag-Ox) 400 mg (241.3 mg magnesium) tablet 1 tablet, oral, Daily    metFORMIN (GLUCOPHAGE) 1,000 mg, oral, Daily    pen needle, diabetic (BD Ultra-Fine Short Pen Needle) 31 gauge x 5/16\" needle USE 4 PEN NEEDLES DAILY    potassium chloride CR 10 mEq ER tablet 10 mEq, oral, Daily    ramipril (ALTACE) 10 mg, oral, Daily    Spiriva Respimat 2.5 mcg/actuation inhaler 2 puffs, inhalation, Daily    warfarin (Coumadin) 2.5 mg tablet TAKE 1 TO 3 TABLETS DAILY OR AS DIRECTED BY NO       Physical Examination:   GENERAL:  Well developed, well nourished, in no acute distress.  HEENT: NC AT, EOMI with anicteric sclera  NECK:  Supple, no JVD, no bruit.  CHEST:  Symmetric and nontender.  LUNGS:  Clear to auscultation bilaterally, normal respiratory effort.  HEART: PMI is nondisplaced.  There is a regular rhythm with an apical heart rate of 58 bpm.  There is a normal S1 and S2 without S3.  There is a soft systolic ejection murmur along the right upper sternal border no diastolic murmurs heard.  There is no carotid bruit.  Pedal pulses are preserved there is normal tissue perfusion.  There is 1-2+ pretibial edema with bilateral varicosities.  ABDOMEN: Soft, NT, ND without palpable organomegaly or bruits  EXTREMITIES:  Warm with good color, no clubbing or cyanosis.  There is 1-2+ edema noted.  PERIPHERAL VASCULAR:  Pulses present and equally palpable.  MUSCULOSKELETAL: Osteoarthritic changes.  NEURO/PSYCH:  Alert and oriented times three with approppriate behavior and responses. Nonfocal motor examination.  Gait is slow with the use of a cane.  Lymph: No " "significant palpable lymphadenopathy  Skin: no rash or lesions on exposed skin or reported.    Lab:     CBC:   Lab Results   Component Value Date    WBC 9.7 03/11/2024    RBC 3.81 (L) 03/11/2024    HGB 11.5 (L) 03/11/2024    HCT 35.6 (L) 03/11/2024     03/11/2024        CMP:    Lab Results   Component Value Date     05/14/2024    K 4.7 05/14/2024     05/14/2024    CO2 26 05/14/2024    BUN 39 (H) 05/14/2024    CREATININE 1.85 (H) 05/14/2024    GLUCOSE 182 (H) 05/14/2024    CALCIUM 8.7 05/14/2024       Magnesium:    Lab Results   Component Value Date    MG 1.99 08/31/2023       Lipid Profile:    Lab Results   Component Value Date    TRIG 109 07/30/2024    HDL 41 (A) 07/30/2024    LDLCALC 45 07/30/2024    LDLDIRECT 46 08/31/2023       TSH:    Lab Results   Component Value Date    TSH 3.33 01/07/2021       BNP:   Lab Results   Component Value Date     (H) 03/11/2024        PT/INR:    Lab Results   Component Value Date    PROTIME 29.7 (H) 09/03/2024    INR 2.6 (H) 09/03/2024       HgBA1c:    Lab Results   Component Value Date    HGBA1C 7.6 (A) 07/23/2024       BMP:  Lab Results   Component Value Date     05/14/2024     04/26/2024     03/11/2024    K 4.7 05/14/2024    K 4.8 04/26/2024    K 4.9 03/11/2024     05/14/2024     04/26/2024     03/11/2024    CO2 26 05/14/2024    CO2 25 04/26/2024    CO2 30 03/11/2024    BUN 39 (H) 05/14/2024    BUN 35 (H) 04/26/2024    BUN 39 (H) 03/11/2024    CREATININE 1.85 (H) 05/14/2024    CREATININE 1.68 (H) 04/26/2024    CREATININE 1.66 (H) 03/11/2024       Cardiac Enzymes:    No results found for: \"TROPHS\"    Hepatic Function Panel:    Lab Results   Component Value Date    ALKPHOS 68 08/31/2023    ALT 17 08/31/2023    AST 17 08/31/2023    PROT 6.6 08/31/2023    BILITOT 0.5 08/31/2023         Diagnostic Studies:     EKG:   EKG today sinus bradycardia at 58 bpm, RBBB.     Radiology:     No orders to display         ASSESSMENT "     Problem List Items Addressed This Visit       Bilateral leg edema    Diastolic dysfunction - Primary    Heart failure with preserved ejection fraction, borderline, class III (Multi)    Hyperlipidemia    Hypertensive heart disease    Relevant Orders    ECG 12 lead (Clinic Performed) (Completed)    Paroxysmal atrial fibrillation (Multi)    Chronic anticoagulation    Former smoker    RBBB       PLAN     1.  Chronic diastolic heart failure.  Patient has mild volume overload.  He however feels at his baseline.  Last labs showed mildly elevated BN peptide.  For now with his renal insufficiency will hold his current diuretic therapy we have a low threshold for increasing his diuretics.  2.  Hypertension.  Patient's blood pressures were elevated when he first arrived in the office but subsequently calmed down.  Reviewing blood pressure readings he is normally better controlled and has a component of reactive hypertension.  I have asked him to monitor his home blood pressure readings at least 3 times a week when he is sitting and resting several hours after taking his morning medications.  If his blood pressures are elevated we will add amlodipine.  I like to hold off on doing so as he already has lower extremity edema to avoid making that worse.  We did notify nephrology of these changes.  3.  Paroxysmal atrial fibrillation on chronic anticoagulation and high risk medications.  We need to be careful with some of his medications because of the use of Tikosyn for antiarrhythmic therapy.  4.  Bilateral leg edema.  Patient needs to monitor his salts we have encouraged him to wear compressive stockings.  5.  Mixed hyperlipidemia.  Continue risk factor modification.  Due for lipid panel.  6.  Right bundle branch block.  This is a chronic finding in this EKG.  No symptoms of angina pectoris we will continue to follow him clinically.    In the absence of any change in his symptoms and with good reasonable blood pressure control  he would see us in follow-up in 6 months.  He knows to call us if his blood pressures are over 135/90 Yori is worsening shortness of breath or develops any angina.

## 2024-09-10 NOTE — PATIENT INSTRUCTIONS
Measure BP at noon 3 days a week after resting for at least 10 minutes.     Call if blood pressures >> 135/90

## 2024-09-11 ENCOUNTER — LAB (OUTPATIENT)
Dept: LAB | Facility: LAB | Age: 79
End: 2024-09-11
Payer: MEDICARE

## 2024-09-11 DIAGNOSIS — E21.3 HYPERPARATHYROIDISM (MULTI): ICD-10-CM

## 2024-09-11 DIAGNOSIS — N18.32 STAGE 3B CHRONIC KIDNEY DISEASE (MULTI): ICD-10-CM

## 2024-09-11 DIAGNOSIS — E08.22 DIABETES MELLITUS DUE TO UNDERLYING CONDITION WITH DIABETIC CHRONIC KIDNEY DISEASE, UNSPECIFIED CKD STAGE, UNSPECIFIED WHETHER LONG TERM INSULIN USE (MULTI): ICD-10-CM

## 2024-09-11 DIAGNOSIS — I10 ESSENTIAL HYPERTENSION, BENIGN: ICD-10-CM

## 2024-09-11 LAB
25(OH)D3 SERPL-MCNC: 40 NG/ML (ref 30–100)
ANION GAP SERPL CALC-SCNC: 15 MMOL/L (ref 10–20)
BUN SERPL-MCNC: 27 MG/DL (ref 6–23)
CALCIUM SERPL-MCNC: 8.6 MG/DL (ref 8.6–10.3)
CHLORIDE SERPL-SCNC: 107 MMOL/L (ref 98–107)
CO2 SERPL-SCNC: 24 MMOL/L (ref 21–32)
CREAT SERPL-MCNC: 1.56 MG/DL (ref 0.5–1.3)
CREAT UR-MCNC: 66.4 MG/DL (ref 20–370)
EGFRCR SERPLBLD CKD-EPI 2021: 45 ML/MIN/1.73M*2
GLUCOSE SERPL-MCNC: 127 MG/DL (ref 74–99)
POTASSIUM SERPL-SCNC: 4.4 MMOL/L (ref 3.5–5.3)
PROT UR-ACNC: 5 MG/DL (ref 5–25)
PROT/CREAT UR: 0.08 MG/MG CREAT (ref 0–0.17)
SODIUM SERPL-SCNC: 142 MMOL/L (ref 136–145)

## 2024-09-11 PROCEDURE — 84156 ASSAY OF PROTEIN URINE: CPT

## 2024-09-11 PROCEDURE — 80048 BASIC METABOLIC PNL TOTAL CA: CPT

## 2024-09-11 PROCEDURE — 82570 ASSAY OF URINE CREATININE: CPT

## 2024-09-11 PROCEDURE — 83970 ASSAY OF PARATHORMONE: CPT

## 2024-09-11 PROCEDURE — 82306 VITAMIN D 25 HYDROXY: CPT

## 2024-09-11 PROCEDURE — 36415 COLL VENOUS BLD VENIPUNCTURE: CPT

## 2024-09-12 LAB — PTH-INTACT SERPL-MCNC: 71 PG/ML (ref 18.5–88)

## 2024-09-12 RX ORDER — ATORVASTATIN CALCIUM 40 MG/1
40 TABLET, FILM COATED ORAL DAILY
Qty: 90 TABLET | Refills: 3 | Status: SHIPPED | OUTPATIENT
Start: 2024-09-12

## 2024-09-12 RX ORDER — DOFETILIDE 0.12 MG/1
125 CAPSULE ORAL 2 TIMES DAILY
Qty: 180 CAPSULE | Refills: 1 | Status: SHIPPED | OUTPATIENT
Start: 2024-09-12 | End: 2025-03-11

## 2024-09-13 ENCOUNTER — APPOINTMENT (OUTPATIENT)
Dept: NEPHROLOGY | Facility: CLINIC | Age: 79
End: 2024-09-13
Payer: MEDICARE

## 2024-10-01 ENCOUNTER — ANTICOAGULATION - WARFARIN VISIT (OUTPATIENT)
Dept: CARDIOLOGY | Facility: CLINIC | Age: 79
End: 2024-10-01

## 2024-10-01 ENCOUNTER — LAB (OUTPATIENT)
Dept: LAB | Facility: LAB | Age: 79
End: 2024-10-01
Payer: MEDICARE

## 2024-10-01 DIAGNOSIS — I48.0 PAROXYSMAL ATRIAL FIBRILLATION (MULTI): ICD-10-CM

## 2024-10-01 LAB
INR PPP: 2.6 (ref 0.9–1.1)
PROTHROMBIN TIME: 29.9 SECONDS (ref 9.8–12.8)

## 2024-10-01 PROCEDURE — 36415 COLL VENOUS BLD VENIPUNCTURE: CPT

## 2024-10-01 PROCEDURE — 85610 PROTHROMBIN TIME: CPT

## 2024-10-30 ENCOUNTER — LAB (OUTPATIENT)
Dept: LAB | Facility: LAB | Age: 79
End: 2024-10-30
Payer: MEDICARE

## 2024-10-30 ENCOUNTER — ANTICOAGULATION - WARFARIN VISIT (OUTPATIENT)
Dept: CARDIOLOGY | Facility: CLINIC | Age: 79
End: 2024-10-30

## 2024-10-30 DIAGNOSIS — I48.0 PAROXYSMAL ATRIAL FIBRILLATION (MULTI): ICD-10-CM

## 2024-10-30 LAB
INR PPP: 2.7 (ref 0.9–1.1)
PROTHROMBIN TIME: 30.4 SECONDS (ref 9.8–12.8)

## 2024-10-30 PROCEDURE — 36415 COLL VENOUS BLD VENIPUNCTURE: CPT

## 2024-10-30 PROCEDURE — 85610 PROTHROMBIN TIME: CPT

## 2024-11-11 ENCOUNTER — APPOINTMENT (OUTPATIENT)
Dept: ENDOCRINOLOGY | Facility: CLINIC | Age: 79
End: 2024-11-11
Payer: MEDICARE

## 2024-11-11 VITALS
BODY MASS INDEX: 28.99 KG/M2 | HEIGHT: 72 IN | DIASTOLIC BLOOD PRESSURE: 60 MMHG | SYSTOLIC BLOOD PRESSURE: 120 MMHG | WEIGHT: 214 LBS

## 2024-11-11 DIAGNOSIS — Z79.4 TYPE 2 DIABETES MELLITUS WITH DIABETIC POLYNEUROPATHY, WITH LONG-TERM CURRENT USE OF INSULIN: Primary | ICD-10-CM

## 2024-11-11 DIAGNOSIS — Z97.8 USES SELF-APPLIED CONTINUOUS GLUCOSE MONITORING DEVICE: ICD-10-CM

## 2024-11-11 DIAGNOSIS — R60.0 BILATERAL LEG EDEMA: ICD-10-CM

## 2024-11-11 DIAGNOSIS — E11.42 TYPE 2 DIABETES MELLITUS WITH DIABETIC POLYNEUROPATHY, WITH LONG-TERM CURRENT USE OF INSULIN: Primary | ICD-10-CM

## 2024-11-11 DIAGNOSIS — E78.5 HYPERLIPIDEMIA, UNSPECIFIED HYPERLIPIDEMIA TYPE: ICD-10-CM

## 2024-11-11 LAB
POC FINGERSTICK BLOOD GLUCOSE: 89 MG/DL (ref 70–100)
POC HEMOGLOBIN A1C: 7.6 % (ref 4.2–6.5)

## 2024-11-11 PROCEDURE — 95251 CONT GLUC MNTR ANALYSIS I&R: CPT | Performed by: STUDENT IN AN ORGANIZED HEALTH CARE EDUCATION/TRAINING PROGRAM

## 2024-11-11 PROCEDURE — 1159F MED LIST DOCD IN RCRD: CPT | Performed by: STUDENT IN AN ORGANIZED HEALTH CARE EDUCATION/TRAINING PROGRAM

## 2024-11-11 PROCEDURE — 82962 GLUCOSE BLOOD TEST: CPT | Performed by: STUDENT IN AN ORGANIZED HEALTH CARE EDUCATION/TRAINING PROGRAM

## 2024-11-11 PROCEDURE — 83036 HEMOGLOBIN GLYCOSYLATED A1C: CPT | Performed by: STUDENT IN AN ORGANIZED HEALTH CARE EDUCATION/TRAINING PROGRAM

## 2024-11-11 PROCEDURE — 99214 OFFICE O/P EST MOD 30 MIN: CPT | Performed by: STUDENT IN AN ORGANIZED HEALTH CARE EDUCATION/TRAINING PROGRAM

## 2024-11-11 PROCEDURE — G2211 COMPLEX E/M VISIT ADD ON: HCPCS | Performed by: STUDENT IN AN ORGANIZED HEALTH CARE EDUCATION/TRAINING PROGRAM

## 2024-11-11 PROCEDURE — 1123F ACP DISCUSS/DSCN MKR DOCD: CPT | Performed by: STUDENT IN AN ORGANIZED HEALTH CARE EDUCATION/TRAINING PROGRAM

## 2024-11-11 NOTE — PROGRESS NOTES
Subjective   Patient ID: Servando Wells is a 79 y.o. male who presents for Diabetes ( (Servando Wells is a 79 y.o. male who presents for Diabetes (DX dm: >15 years ago /PCP: Puma Mahan /Podiatry: neuropathy-sees  /Eye exam: 1.5 years ago, does have apt soon /Pt testing glucose 4 times daily with freestyle fernanda (original) - READER. Due to fluctuating blood glucose, hypoglycemia and 4 insulin injections. Adjusting meal time insulin based on glucose; compliant with dm regimen.Pt is adhering and benefiting from CGM treatment plan)   Lab Results   Component Value Date    HGBA1C 7.6 (A) 11/11/2024      HPI  A 79 yr old male, with hx of DM II, well controlled, complicated by neuropathy, presented for follow up.      A1c is  7.6  7.6 was 7.4 , 6.8 was      I personally reviewed CGM downloads  -Very high: 29 %  -High 26%  -Target range 42 %  -Low 3%  -Very low 0 %           Review of Systems    Objective   Physical Exam  Constitutional:       Appearance: Normal appearance.   Cardiovascular:      Rate and Rhythm: Normal rate and regular rhythm.   Pulmonary:      Effort: Pulmonary effort is normal.      Breath sounds: Normal breath sounds.   Musculoskeletal:      Comments: Uses a cane    Neurological:      Mental Status: He is alert.   Psychiatric:         Mood and Affect: Mood normal.      Visit Vitals  /60   Ht 1.829 m (6')   Wt 97.1 kg (214 lb)   BMI 29.02 kg/m²   Smoking Status Former   BSA 2.22 m²        Assessment/Plan         Type II DM, well controlled, on MDI A1c  7.6 ,  on  MDI and Metformin, complicated  with diabetic neuropathy ( s/p laser treatment ) and hypoglycemia. Goal A1c < 7.5    HLD well controlled on atorvasatin 40 mg   HTN controlled on ACE         Plan:  - Advised to not use Humalog before bedtime to prevent hypoglycemia   - continue  metformin a total of 1000 mg  daily GFR 45  - continue Lantus to 18 units at night ( currently taking dose between 18-22 units)  -continue  humlaog to 8   units with meals, plus sliding scale SS2

## 2024-11-15 RX ORDER — FUROSEMIDE 40 MG/1
40 TABLET ORAL DAILY
Qty: 90 TABLET | Refills: 1 | Status: SHIPPED | OUTPATIENT
Start: 2024-11-15 | End: 2025-05-14

## 2024-11-20 DIAGNOSIS — E11.42 TYPE 2 DIABETES MELLITUS WITH DIABETIC POLYNEUROPATHY, WITH LONG-TERM CURRENT USE OF INSULIN: ICD-10-CM

## 2024-11-20 DIAGNOSIS — Z79.4 TYPE 2 DIABETES MELLITUS WITH DIABETIC POLYNEUROPATHY, WITH LONG-TERM CURRENT USE OF INSULIN: ICD-10-CM

## 2024-11-20 RX ORDER — METFORMIN HYDROCHLORIDE 1000 MG/1
1000 TABLET ORAL DAILY
Qty: 90 TABLET | Refills: 1 | Status: SHIPPED | OUTPATIENT
Start: 2024-11-20

## 2024-11-25 ENCOUNTER — ANTICOAGULATION - WARFARIN VISIT (OUTPATIENT)
Dept: CARDIOLOGY | Facility: CLINIC | Age: 79
End: 2024-11-25

## 2024-11-25 ENCOUNTER — LAB (OUTPATIENT)
Dept: LAB | Facility: LAB | Age: 79
End: 2024-11-25
Payer: MEDICARE

## 2024-11-25 DIAGNOSIS — I48.0 PAROXYSMAL ATRIAL FIBRILLATION (MULTI): ICD-10-CM

## 2024-11-25 LAB
INR PPP: 3.6 (ref 0.9–1.1)
PROTHROMBIN TIME: 41.6 SECONDS (ref 9.8–12.8)

## 2024-11-25 PROCEDURE — 36415 COLL VENOUS BLD VENIPUNCTURE: CPT

## 2024-11-25 PROCEDURE — 85610 PROTHROMBIN TIME: CPT

## 2024-12-10 ENCOUNTER — LAB (OUTPATIENT)
Dept: LAB | Facility: LAB | Age: 79
End: 2024-12-10
Payer: MEDICARE

## 2024-12-10 ENCOUNTER — ANTICOAGULATION - WARFARIN VISIT (OUTPATIENT)
Dept: CARDIOLOGY | Facility: CLINIC | Age: 79
End: 2024-12-10

## 2024-12-10 DIAGNOSIS — I48.0 PAROXYSMAL ATRIAL FIBRILLATION (MULTI): ICD-10-CM

## 2024-12-10 LAB
INR PPP: 2.4 (ref 0.9–1.1)
PROTHROMBIN TIME: 27 SECONDS (ref 9.8–12.8)

## 2024-12-10 PROCEDURE — 36415 COLL VENOUS BLD VENIPUNCTURE: CPT

## 2024-12-10 PROCEDURE — 85610 PROTHROMBIN TIME: CPT

## 2025-01-14 ENCOUNTER — LAB (OUTPATIENT)
Dept: LAB | Facility: LAB | Age: 80
End: 2025-01-14
Payer: MEDICARE

## 2025-01-14 ENCOUNTER — ANTICOAGULATION - WARFARIN VISIT (OUTPATIENT)
Dept: CARDIOLOGY | Facility: CLINIC | Age: 80
End: 2025-01-14

## 2025-01-14 DIAGNOSIS — I48.0 PAROXYSMAL ATRIAL FIBRILLATION (MULTI): ICD-10-CM

## 2025-01-14 LAB
INR PPP: 3.5 (ref 0.9–1.1)
PROTHROMBIN TIME: 40.2 SECONDS (ref 9.8–12.8)

## 2025-01-14 PROCEDURE — 85610 PROTHROMBIN TIME: CPT

## 2025-01-15 ENCOUNTER — APPOINTMENT (OUTPATIENT)
Dept: NEPHROLOGY | Facility: CLINIC | Age: 80
End: 2025-01-15
Payer: MEDICARE

## 2025-01-15 DIAGNOSIS — Z79.4 TYPE 2 DIABETES MELLITUS WITH DIABETIC POLYNEUROPATHY, WITH LONG-TERM CURRENT USE OF INSULIN: ICD-10-CM

## 2025-01-15 DIAGNOSIS — E11.42 TYPE 2 DIABETES MELLITUS WITH DIABETIC POLYNEUROPATHY, WITH LONG-TERM CURRENT USE OF INSULIN: ICD-10-CM

## 2025-01-15 RX ORDER — INSULIN LISPRO 100 [IU]/ML
INJECTION, SOLUTION INTRAVENOUS; SUBCUTANEOUS
Qty: 45 ML | Refills: 1 | Status: SHIPPED | OUTPATIENT
Start: 2025-01-15

## 2025-01-22 ENCOUNTER — APPOINTMENT (OUTPATIENT)
Dept: NEPHROLOGY | Facility: CLINIC | Age: 80
End: 2025-01-22
Payer: MEDICARE

## 2025-01-24 DIAGNOSIS — I48.0 PAROXYSMAL ATRIAL FIBRILLATION (MULTI): ICD-10-CM

## 2025-01-24 RX ORDER — WARFARIN 2.5 MG/1
TABLET ORAL
Qty: 270 TABLET | Refills: 1 | Status: SHIPPED | OUTPATIENT
Start: 2025-01-24

## 2025-01-28 ENCOUNTER — APPOINTMENT (OUTPATIENT)
Dept: PRIMARY CARE | Facility: CLINIC | Age: 80
End: 2025-01-28
Payer: MEDICARE

## 2025-02-03 ENCOUNTER — ANTICOAGULATION - WARFARIN VISIT (OUTPATIENT)
Dept: CARDIOLOGY | Facility: CLINIC | Age: 80
End: 2025-02-03
Payer: MEDICARE

## 2025-02-04 LAB
INR PPP: 3.1
PROTHROMBIN TIME: 30.4 SEC (ref 9–11.5)

## 2025-02-05 ENCOUNTER — APPOINTMENT (OUTPATIENT)
Dept: NEPHROLOGY | Facility: CLINIC | Age: 80
End: 2025-02-05
Payer: MEDICARE

## 2025-02-05 VITALS
HEART RATE: 60 BPM | SYSTOLIC BLOOD PRESSURE: 128 MMHG | DIASTOLIC BLOOD PRESSURE: 67 MMHG | BODY MASS INDEX: 29.02 KG/M2 | HEIGHT: 72 IN

## 2025-02-05 DIAGNOSIS — N18.32 STAGE 3B CHRONIC KIDNEY DISEASE (MULTI): ICD-10-CM

## 2025-02-05 DIAGNOSIS — I10 ESSENTIAL HYPERTENSION: ICD-10-CM

## 2025-02-05 DIAGNOSIS — I10 ESSENTIAL HYPERTENSION, BENIGN: Primary | ICD-10-CM

## 2025-02-05 DIAGNOSIS — E21.3 HYPERPARATHYROIDISM (MULTI): ICD-10-CM

## 2025-02-05 DIAGNOSIS — E08.22 DIABETES MELLITUS DUE TO UNDERLYING CONDITION WITH DIABETIC CHRONIC KIDNEY DISEASE, UNSPECIFIED CKD STAGE, UNSPECIFIED WHETHER LONG TERM INSULIN USE: ICD-10-CM

## 2025-02-05 PROCEDURE — 99213 OFFICE O/P EST LOW 20 MIN: CPT | Performed by: INTERNAL MEDICINE

## 2025-02-05 PROCEDURE — 3074F SYST BP LT 130 MM HG: CPT | Performed by: INTERNAL MEDICINE

## 2025-02-05 PROCEDURE — 3078F DIAST BP <80 MM HG: CPT | Performed by: INTERNAL MEDICINE

## 2025-02-05 PROCEDURE — 1036F TOBACCO NON-USER: CPT | Performed by: INTERNAL MEDICINE

## 2025-02-05 PROCEDURE — 1123F ACP DISCUSS/DSCN MKR DOCD: CPT | Performed by: INTERNAL MEDICINE

## 2025-02-05 RX ORDER — CLONIDINE 0.1 MG/24H
PATCH, EXTENDED RELEASE TRANSDERMAL
Qty: 13 PATCH | Refills: 3 | Status: SHIPPED | OUTPATIENT
Start: 2025-02-05 | End: 2025-02-05 | Stop reason: ENTERED-IN-ERROR

## 2025-02-05 NOTE — PROGRESS NOTES
Servando Connelldanilo   79 yChaso.    @@  [facility]/Room: 05851102/Room/bed info not found    Subjective:   The patient is being seen for a routine clinic follow-up of chronic kidney disease. Recently, the disease has been stable. Disease complications:  No hyperkalemia, no hypocalcemia, no hyperphosphatemia, no metabolic acidosis, no coagulopathy, no uremic encephalopathy, no neuropathy and no renal osteodystrophy. The patient is currently asymptomatic. No associated symptoms are reported.       Meds:   Current Outpatient Medications   Medication Sig Dispense Refill    albuterol 90 mcg/actuation inhaler Inhale 2 puffs every 4 hours if needed.      atorvastatin (Lipitor) 40 mg tablet Take 1 tablet (40 mg) by mouth once daily. 90 tablet 3    carvedilol CR (Coreg CR) 80 mg 24 hr capsule TAKE 1 CAPSULE DAILY 90 capsule 3    clobetasol (Temovate) 0.05 % cream       clobetasol (Temovate) 0.05 % external solution       clobetasoL 0.05 % shampoo       dofetilide (Tikosyn) 125 mcg capsule Take 1 capsule (125 mcg) by mouth 2 times a day. 180 capsule 1    ergocalciferol (Vitamin D-2) 1.25 MG (62378 UT) capsule Take 1 capsule (50,000 Units) by mouth every 14 (fourteen) days. 6 capsule 2    ergocalciferol (Vitamin D-2) 1.25 MG (43162 UT) capsule Take 1 capsule (50,000 Units) by mouth every 14 (fourteen) days. 6 capsule 2    FreeStyle Cristina 3 [place] misc Inject 1 Device under the skin if needed. Use as instructed      furosemide (Lasix) 40 mg tablet Take 1 tablet (40 mg) by mouth once daily. 90 tablet 1    insulin glargine (Lantus Solostar U-100 Insulin) 100 unit/mL (3 mL) pen Inject 18 Units under the skin once daily. 30 mL 1    insulin lispro (HumaLOG KwikPen Insulin) 100 unit/mL injection INJECT 8 UNITS BEFORE MEALS PLUS SLIDING SCALE. MAXIMUM DAILY DOSE 50 UNITS 45 mL 1    magnesium oxide (Mag-Ox) 400 mg (241.3 mg magnesium) tablet Take 1 tablet (400 mg) by mouth once daily.      metFORMIN (Glucophage) 1,000 mg tablet TAKE 1 TABLET DAILY  "90 tablet 1    pen needle, diabetic (BD Ultra-Fine Short Pen Needle) 31 gauge x 5/16\" needle USE 4 PEN NEEDLES DAILY 400 each 1    potassium chloride CR 10 mEq ER tablet TAKE 1 TABLET DAILY 90 tablet 3    ramipril (Altace) 10 mg capsule Take 1 capsule (10 mg) by mouth once daily. 90 capsule 3    Spiriva Respimat 2.5 mcg/actuation inhaler Inhale 2 puffs once daily.      warfarin (Coumadin) 2.5 mg tablet Take 1 -3 tablets daily as directed based on INR. 270 tablet 1     No current facility-administered medications for this visit.          ROS:  The patient is awake and oriented. No dizziness or lightheadedness. No chills and no fever. No headaches. No nausea and no vomiting. No shortness of breath. No cough. No chest pain. No abdominal pain. No diarrhea. No hematemesis or hemoptysis. No hematuria. No rectal bleeding. No melena. No epistaxis. No urinary symptoms. No flank pain. No leg edema. No itching. Overall, the rest of the review of systems is also negative.  12 point review of systems otherwise negative as stated in HPI.        Physical Examination:        Vitals:    02/05/25 1325   BP: 128/67   Pulse: 60     General: The patient is awake, oriented, and is not in any distress.  Head and Neck: Normocephalic. No periorbital edema.  Eyes: non-icteric  Respiratory: Symmetric chest expansion. No respiratory distress.  Skin: No maculopapular rash.  Musculoskeletal: No peripheral edema.  Neuro Exam: Speech is fluent. Moves extremities.    Imaging:  === 04/26/24 ===    US RENAL COMPLETE    - Impression -  No hydronephrosis. Nonobstructing right nephrolithiasis (3-4 mm).    MACRO:  None    Signed by: Laura Carson 4/28/2024 1:02 PM  Dictation workstation:   [id number]       Blood Labs:  No results found for this or any previous visit (from the past 24 hours).   Lab Results   Component Value Date    PTH 71.0 09/11/2024      Lab Results   Component Value Date    GLUCOSE 127 (H) 09/11/2024    CALCIUM 8.6 09/11/2024    NA " 142 09/11/2024    K 4.4 09/11/2024    CO2 24 09/11/2024     09/11/2024    BUN 27 (H) 09/11/2024    CREATININE 1.56 (H) 09/11/2024         Assessment and Plan:    #1 chronic kidney disease stage III.  Last creatinine level from September 2024 is 1.56.  I asked for a BMP to be done today.     2.  Hypertension.  Blood pressure is under control. Continue the current meds.     3.  Diabetes.  No proteinuria based on spot urine protein to creatinine ratio.       I will see him in about 4 months for follow-up.        Toribio Sosa MD  Senior Attending Physician  Director of Onco-Nephrology Program  Division of Nephrology & Hypertension  [facility]

## 2025-02-08 LAB
25(OH)D3+25(OH)D2 SERPL-MCNC: 54 NG/ML (ref 30–100)
ANION GAP SERPL CALCULATED.4IONS-SCNC: 9 MMOL/L (CALC) (ref 7–17)
BUN SERPL-MCNC: 38 MG/DL (ref 7–25)
BUN/CREAT SERPL: 20 (CALC) (ref 6–22)
CALCIUM SERPL-MCNC: 8.8 MG/DL (ref 8.6–10.3)
CHLORIDE SERPL-SCNC: 103 MMOL/L (ref 98–110)
CO2 SERPL-SCNC: 28 MMOL/L (ref 20–32)
CREAT SERPL-MCNC: 1.89 MG/DL (ref 0.7–1.28)
EGFRCR SERPLBLD CKD-EPI 2021: 36 ML/MIN/1.73M2
GLUCOSE SERPL-MCNC: 268 MG/DL (ref 65–139)
POTASSIUM SERPL-SCNC: 5.2 MMOL/L (ref 3.5–5.3)
PTH-INTACT SERPL-MCNC: 50 PG/ML (ref 16–77)
SODIUM SERPL-SCNC: 140 MMOL/L (ref 135–146)

## 2025-02-10 ENCOUNTER — TELEPHONE (OUTPATIENT)
Dept: NEPHROLOGY | Facility: CLINIC | Age: 80
End: 2025-02-10
Payer: MEDICARE

## 2025-02-10 NOTE — PATIENT INSTRUCTIONS
See your eye doctor for a dilated eye exam at least every year to screen for the gradual loss of vision that can occur with Glaucoma and the potentially catastrophic effects of Diabetes.    Follow up fasting (no alcohol for 48 hours and just water for 14 hours) in three months for your next routine appointment.  In general, take any medications on schedule (except for types of Insulin).

## 2025-02-10 NOTE — PROGRESS NOTES
Subjective   Patient ID: [id number]     Servando Wells is a 79 y.o. male who presents for Med Management.    HPI  Taking meds as directed without tolerability or affordability issues; no complaints today.    Review of Systems  CARDIO- No chest pain or pressure, nausea, diaphoresis, paresthesias, dizziness, or syncope with or without exertion;  sleeping flat  GI-No blood in stool, tarry stools, pain, vomiting, heartburn, constipation or diarrhea  PULM-No wheezing, coughing or shortness of breath  UROL-No frequency, urgency, blood in urine, or incontinence; nocturia occasionally    Objective     /61 (BP Location: Left arm, Patient Position: Sitting)   Temp 36.5 °C (97.7 °F) (Oral)      Physical Exam  Neck-supple without lymphadenopathy or thyromegaly; no carotid bruits  Heart- regular rate and rhythm, normal s1 and s2 without murmur or gallop; no edema  Lungs-clear to auscultation  Abdomen-soft, positive bowel sounds, without masses, HSmegaly or pain   Rectal- normal ampulla and anus; hemetest negative; prostate smooth, normal size, and symmetrical    Assessment/Plan     Problem List Items Addressed This Visit       Type 2 diabetes mellitus with diabetic neuropathy, with long-term current use of insulin    Relevant Orders    Hemoglobin A1C    Thyroid Stimulating Hormone    POCT Albumin random urine manually resulted    Diastolic dysfunction    Relevant Orders    Thyroid Stimulating Hormone    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    Relevant Orders    Thyroid Stimulating Hormone    Hypertensive heart disease    Relevant Orders    Comprehensive Metabolic Panel    Thyroid Stimulating Hormone    Paroxysmal atrial fibrillation (Multi)    Relevant Orders    Thyroid Stimulating Hormone    RBBB    Relevant Orders    Thyroid Stimulating Hormone     Other Visit Diagnoses       Health care maintenance        Relevant Orders    Thyroid Stimulating Hormone    Hepatitis C Antibody          See your eye doctor  for a dilated eye exam at least every year to screen for the gradual loss of vision that can occur with Glaucoma and the potentially catastrophic effects of Diabetes.    Follow up fasting (no alcohol for 48 hours and just water for 14 hours) in three months for your next routine appointment.  In general, take any medications on schedule (except for types of Insulin).      Puma Mahan MD

## 2025-02-10 NOTE — TELEPHONE ENCOUNTER
----- Message from Toribio Sosa sent at 2/10/2025  8:43 AM EST -----  Creatinine level is slightly higher than his previous number.

## 2025-02-11 ENCOUNTER — APPOINTMENT (OUTPATIENT)
Dept: PRIMARY CARE | Facility: CLINIC | Age: 80
End: 2025-02-11
Payer: MEDICARE

## 2025-02-11 VITALS — TEMPERATURE: 97.7 F | SYSTOLIC BLOOD PRESSURE: 141 MMHG | DIASTOLIC BLOOD PRESSURE: 61 MMHG

## 2025-02-11 DIAGNOSIS — I48.0 PAROXYSMAL ATRIAL FIBRILLATION (MULTI): ICD-10-CM

## 2025-02-11 DIAGNOSIS — E11.40 TYPE 2 DIABETES MELLITUS WITH DIABETIC NEUROPATHY, WITH LONG-TERM CURRENT USE OF INSULIN: ICD-10-CM

## 2025-02-11 DIAGNOSIS — I11.9 HYPERTENSIVE HEART DISEASE, UNSPECIFIED WHETHER HEART FAILURE PRESENT: ICD-10-CM

## 2025-02-11 DIAGNOSIS — I45.10 RBBB: ICD-10-CM

## 2025-02-11 DIAGNOSIS — Z00.00 HEALTH CARE MAINTENANCE: ICD-10-CM

## 2025-02-11 DIAGNOSIS — I13.10 HYPERTENSIVE HEART DISEASE WITH HYPERTENSIVE CHRONIC KIDNEY DISEASE, UNSPECIFIED CKD STAGE, UNSPECIFIED WHETHER HEART FAILURE PRESENT: Primary | ICD-10-CM

## 2025-02-11 DIAGNOSIS — Z79.4 TYPE 2 DIABETES MELLITUS WITH DIABETIC NEUROPATHY, WITH LONG-TERM CURRENT USE OF INSULIN: ICD-10-CM

## 2025-02-11 DIAGNOSIS — I51.89 DIASTOLIC DYSFUNCTION: ICD-10-CM

## 2025-02-11 LAB
POC ALBUMIN /CREATININE RATIO MANUALLY ENTERED: ABNORMAL UG/MG CREAT
POC URINE ALBUMIN: 80 MG/L
POC URINE CREATININE: 200 MG/DL

## 2025-02-11 PROCEDURE — 82044 UR ALBUMIN SEMIQUANTITATIVE: CPT | Performed by: FAMILY MEDICINE

## 2025-02-11 PROCEDURE — 1159F MED LIST DOCD IN RCRD: CPT | Performed by: FAMILY MEDICINE

## 2025-02-11 PROCEDURE — 1160F RVW MEDS BY RX/DR IN RCRD: CPT | Performed by: FAMILY MEDICINE

## 2025-02-11 PROCEDURE — 99214 OFFICE O/P EST MOD 30 MIN: CPT | Performed by: FAMILY MEDICINE

## 2025-02-11 PROCEDURE — 1123F ACP DISCUSS/DSCN MKR DOCD: CPT | Performed by: FAMILY MEDICINE

## 2025-02-11 RX ORDER — GABAPENTIN 100 MG/1
100 CAPSULE ORAL NIGHTLY
COMMUNITY
Start: 2025-02-04

## 2025-02-12 DIAGNOSIS — I10 ESSENTIAL HYPERTENSION, BENIGN: ICD-10-CM

## 2025-02-12 DIAGNOSIS — N18.32 STAGE 3B CHRONIC KIDNEY DISEASE (MULTI): Primary | ICD-10-CM

## 2025-02-12 DIAGNOSIS — R80.9 MICROALBUMINURIA: ICD-10-CM

## 2025-02-12 PROBLEM — N18.9 CKD (CHRONIC KIDNEY DISEASE): Status: ACTIVE | Noted: 2025-02-12

## 2025-02-12 LAB
ALBUMIN SERPL-MCNC: 4 G/DL (ref 3.6–5.1)
ALP SERPL-CCNC: 76 U/L (ref 35–144)
ALT SERPL-CCNC: 17 U/L (ref 9–46)
ANION GAP SERPL CALCULATED.4IONS-SCNC: 9 MMOL/L (CALC) (ref 7–17)
AST SERPL-CCNC: 15 U/L (ref 10–35)
BILIRUB SERPL-MCNC: 0.5 MG/DL (ref 0.2–1.2)
BUN SERPL-MCNC: 32 MG/DL (ref 7–25)
CALCIUM SERPL-MCNC: 8.9 MG/DL (ref 8.6–10.3)
CHLORIDE SERPL-SCNC: 105 MMOL/L (ref 98–110)
CO2 SERPL-SCNC: 27 MMOL/L (ref 20–32)
CREAT SERPL-MCNC: 1.64 MG/DL (ref 0.7–1.28)
EGFRCR SERPLBLD CKD-EPI 2021: 43 ML/MIN/1.73M2
EST. AVERAGE GLUCOSE BLD GHB EST-MCNC: 200 MG/DL
EST. AVERAGE GLUCOSE BLD GHB EST-SCNC: 11.1 MMOL/L
GLUCOSE SERPL-MCNC: 122 MG/DL (ref 65–99)
HBA1C MFR BLD: 8.6 % OF TOTAL HGB
HCV AB SERPL QL IA: NORMAL
POTASSIUM SERPL-SCNC: 5.2 MMOL/L (ref 3.5–5.3)
PROT SERPL-MCNC: 6.7 G/DL (ref 6.1–8.1)
SODIUM SERPL-SCNC: 141 MMOL/L (ref 135–146)
TSH SERPL-ACNC: 3.95 MIU/L (ref 0.4–4.5)

## 2025-02-12 RX ORDER — RAMIPRIL 10 MG/1
10 CAPSULE ORAL DAILY
Qty: 90 CAPSULE | Refills: 3 | Status: SHIPPED | OUTPATIENT
Start: 2025-02-12 | End: 2026-02-12

## 2025-02-19 ENCOUNTER — ANTICOAGULATION - WARFARIN VISIT (OUTPATIENT)
Dept: CARDIOLOGY | Facility: CLINIC | Age: 80
End: 2025-02-19
Payer: MEDICARE

## 2025-02-19 LAB
INR PPP: 2.3
PROTHROMBIN TIME: 23.3 SEC (ref 9–11.5)

## 2025-02-21 ENCOUNTER — TELEPHONE (OUTPATIENT)
Dept: PRIMARY CARE | Facility: CLINIC | Age: 80
End: 2025-02-21
Payer: MEDICARE

## 2025-02-21 DIAGNOSIS — E11.42 TYPE 2 DIABETES MELLITUS WITH DIABETIC POLYNEUROPATHY, WITH LONG-TERM CURRENT USE OF INSULIN: ICD-10-CM

## 2025-02-21 DIAGNOSIS — Z79.4 TYPE 2 DIABETES MELLITUS WITH DIABETIC POLYNEUROPATHY, WITH LONG-TERM CURRENT USE OF INSULIN: ICD-10-CM

## 2025-02-21 RX ORDER — INSULIN GLARGINE 100 [IU]/ML
18 INJECTION, SOLUTION SUBCUTANEOUS DAILY
Qty: 6 ML | Refills: 0 | Status: SHIPPED | OUTPATIENT
Start: 2025-02-21 | End: 2025-02-21 | Stop reason: SDUPTHER

## 2025-02-21 RX ORDER — INSULIN GLARGINE 100 [IU]/ML
18 INJECTION, SOLUTION SUBCUTANEOUS DAILY
Qty: 6 ML | Refills: 0 | Status: SHIPPED | OUTPATIENT
Start: 2025-02-21

## 2025-02-21 NOTE — TELEPHONE ENCOUNTER
He needs this as soon as possible almost out    Med name-lantus solostar  Med dose-100 unit/ml  Med directions-inject 18 units under skin once a day    Pharmacy-[facility]  Pharmacy address-[facility]    LR-02/12/24  Puma Mahan patient  Patient has a new endo(last one retired) does not see her until 05/20/25. He knows you can only send in a 30 day supply    Thanks

## 2025-03-04 NOTE — PATIENT INSTRUCTIONS
Never take additional Lantus (the long acting insulin you take at night).  Follow up with Dr Osborne for your diabetic issues in the future, but feel free to call us if you have questions.    Follow up fasting (no alcohol for 48 hours and just water for 14 hours) in six months for your next routine appointment.  In general, take any medications on schedule (except for types of Insulin).

## 2025-03-04 NOTE — PROGRESS NOTES
Subjective   Patient ID: [id number]     Servando Wells is a 79 y.o. male who presents for Follow-up.    HPI  Mr. Wells returns after increasing his Lantus from 18 to 20 units per day.  Glucose log reviewed and has some low readings in the morning but he states he will sometimes give himself extra lantus.  He will be seeing Dr Osborne as an endocrinologist.    Review of Systems  CARDIO- No chest pain or pressure, nausea, diaphoresis, paresthesias, dizziness, or syncope with or without exertion  GI-No blood in stool, tarry stools, pain, vomiting, heartburn, constipation or diarrhea  PULM-No wheezing, coughing or shortness of breath  UROL-No frequency, urgency, blood in urine, or incontinence; nocturia not present    Objective     /62 (BP Location: Right arm, Patient Position: Sitting)   Temp 36.6 °C (97.9 °F) (Oral)      Physical Exam  Neck-supple without lymphadenopathy or thyromegaly; no carotid bruits  Heart- regular rate and rhythm, normal s1 and s2 without murmur or gallop; no edema  Lungs-clear to auscultation  Abdomen-soft, positive bowel sounds, without masses, HSmegaly or pain     Assessment/Plan     Problem List Items Addressed This Visit       Type 2 diabetes mellitus with diabetic neuropathy, with long-term current use of insulin - Primary    Relevant Orders    POCT fingerstick glucose manually resulted (Completed)     Other Visit Diagnoses       Type 2 diabetes mellitus with diabetic polyneuropathy, with long-term current use of insulin              Never take additional Lantus (the long acting insulin you take at night).  Follow up with Dr Osborne for your diabetic issues in the future, but feel free to call us if you have questions.    Follow up fasting (no alcohol for 48 hours and just water for 14 hours) in six months for your next routine appointment.  In general, take any medications on schedule (except for types of Insulin).      Puma Mahan MD

## 2025-03-05 ENCOUNTER — APPOINTMENT (OUTPATIENT)
Dept: PRIMARY CARE | Facility: CLINIC | Age: 80
End: 2025-03-05
Payer: MEDICARE

## 2025-03-05 VITALS — DIASTOLIC BLOOD PRESSURE: 62 MMHG | TEMPERATURE: 97.9 F | SYSTOLIC BLOOD PRESSURE: 136 MMHG

## 2025-03-05 DIAGNOSIS — E11.42 TYPE 2 DIABETES MELLITUS WITH DIABETIC POLYNEUROPATHY, WITH LONG-TERM CURRENT USE OF INSULIN: ICD-10-CM

## 2025-03-05 DIAGNOSIS — Z79.4 TYPE 2 DIABETES MELLITUS WITH DIABETIC NEUROPATHY, WITH LONG-TERM CURRENT USE OF INSULIN: Primary | ICD-10-CM

## 2025-03-05 DIAGNOSIS — Z79.4 TYPE 2 DIABETES MELLITUS WITH DIABETIC POLYNEUROPATHY, WITH LONG-TERM CURRENT USE OF INSULIN: ICD-10-CM

## 2025-03-05 DIAGNOSIS — E11.40 TYPE 2 DIABETES MELLITUS WITH DIABETIC NEUROPATHY, WITH LONG-TERM CURRENT USE OF INSULIN: Primary | ICD-10-CM

## 2025-03-05 LAB — POC FINGERSTICK BLOOD GLUCOSE: 129 MG/DL (ref 70–100)

## 2025-03-05 PROCEDURE — 82962 GLUCOSE BLOOD TEST: CPT | Performed by: FAMILY MEDICINE

## 2025-03-05 PROCEDURE — 99213 OFFICE O/P EST LOW 20 MIN: CPT | Performed by: FAMILY MEDICINE

## 2025-03-05 PROCEDURE — 1160F RVW MEDS BY RX/DR IN RCRD: CPT | Performed by: FAMILY MEDICINE

## 2025-03-05 PROCEDURE — 1123F ACP DISCUSS/DSCN MKR DOCD: CPT | Performed by: FAMILY MEDICINE

## 2025-03-05 PROCEDURE — 1159F MED LIST DOCD IN RCRD: CPT | Performed by: FAMILY MEDICINE

## 2025-03-06 DIAGNOSIS — I48.0 PAROXYSMAL ATRIAL FIBRILLATION (MULTI): ICD-10-CM

## 2025-03-06 RX ORDER — DOFETILIDE 0.12 MG/1
125 CAPSULE ORAL 2 TIMES DAILY
Qty: 180 CAPSULE | Refills: 0 | Status: SHIPPED | OUTPATIENT
Start: 2025-03-06 | End: 2025-06-04

## 2025-03-10 DIAGNOSIS — E55.9 VITAMIN D DEFICIENCY: ICD-10-CM

## 2025-03-12 RX ORDER — ERGOCALCIFEROL 1.25 MG/1
CAPSULE ORAL
Qty: 6 CAPSULE | Refills: 3 | Status: SHIPPED | OUTPATIENT
Start: 2025-03-12

## 2025-03-18 ENCOUNTER — APPOINTMENT (OUTPATIENT)
Dept: CARDIOLOGY | Facility: CLINIC | Age: 80
End: 2025-03-18
Payer: MEDICARE

## 2025-03-18 VITALS
HEIGHT: 72 IN | HEART RATE: 62 BPM | SYSTOLIC BLOOD PRESSURE: 142 MMHG | DIASTOLIC BLOOD PRESSURE: 58 MMHG | BODY MASS INDEX: 28.99 KG/M2 | WEIGHT: 214 LBS

## 2025-03-18 DIAGNOSIS — I45.10 RBBB: ICD-10-CM

## 2025-03-18 DIAGNOSIS — I50.30 HEART FAILURE WITH PRESERVED EJECTION FRACTION, BORDERLINE, CLASS III: ICD-10-CM

## 2025-03-18 DIAGNOSIS — I48.0 PAROXYSMAL ATRIAL FIBRILLATION (MULTI): Primary | ICD-10-CM

## 2025-03-18 DIAGNOSIS — R60.0 BILATERAL LEG EDEMA: ICD-10-CM

## 2025-03-18 DIAGNOSIS — I11.9 HYPERTENSIVE HEART DISEASE, UNSPECIFIED WHETHER HEART FAILURE PRESENT: ICD-10-CM

## 2025-03-18 DIAGNOSIS — Z79.01 CHRONIC ANTICOAGULATION: ICD-10-CM

## 2025-03-18 DIAGNOSIS — Z79.899 HIGH RISK MEDICATION USE: ICD-10-CM

## 2025-03-18 PROCEDURE — G2211 COMPLEX E/M VISIT ADD ON: HCPCS | Performed by: INTERNAL MEDICINE

## 2025-03-18 PROCEDURE — 1160F RVW MEDS BY RX/DR IN RCRD: CPT | Performed by: INTERNAL MEDICINE

## 2025-03-18 PROCEDURE — 1123F ACP DISCUSS/DSCN MKR DOCD: CPT | Performed by: INTERNAL MEDICINE

## 2025-03-18 PROCEDURE — 1159F MED LIST DOCD IN RCRD: CPT | Performed by: INTERNAL MEDICINE

## 2025-03-18 PROCEDURE — 1036F TOBACCO NON-USER: CPT | Performed by: INTERNAL MEDICINE

## 2025-03-18 PROCEDURE — 99214 OFFICE O/P EST MOD 30 MIN: CPT | Performed by: INTERNAL MEDICINE

## 2025-03-18 RX ORDER — CARVEDILOL PHOSPHATE 80 MG/1
80 CAPSULE, EXTENDED RELEASE ORAL DAILY
Qty: 90 CAPSULE | Refills: 1 | Status: SHIPPED | OUTPATIENT
Start: 2025-03-18

## 2025-03-18 RX ORDER — DOFETILIDE 0.12 MG/1
125 CAPSULE ORAL 2 TIMES DAILY
Qty: 180 CAPSULE | Refills: 1 | Status: SHIPPED | OUTPATIENT
Start: 2025-03-18 | End: 2025-09-14

## 2025-03-18 ASSESSMENT — ENCOUNTER SYMPTOMS
DYSURIA: 0
PALPITATIONS: 0
GASTROINTESTINAL NEGATIVE: 1
RESPIRATORY NEGATIVE: 1
ARTHRALGIAS: 1
NEUROLOGICAL NEGATIVE: 1
DIFFICULTY URINATING: 1
PSYCHIATRIC NEGATIVE: 1

## 2025-03-18 NOTE — PROGRESS NOTES
Patient:  Servando Wells  YOB: 1945  MRN: [id number]       Chief Complaint/Active Symptoms:       Servando Wells is a 79 y.o. male who returns today for cardiac follow-up.    The patient is here for 6-month follow-up.  He has been doing well without any bleeding complications or symptomatic episodes of atrial fibrillation.  He has had no symptomatic A-fib for the past 5 or 6 years since he has been on Tikosyn therapy.    He has no chest pain or discomfort, he has no shortness of breath, orthopnea or PND.  He has some lower extremity edema that the severity of which fluctuates.  He has had no dizziness or lightheadedness.  He does have kidney insufficiency and is followed intermittently by nephrology.  He has not seen or followed up with a heart rhythm specialist ever since he was placed on Tikosyn therapy.    Review of Systems   HENT:  Positive for congestion and hearing loss.    Respiratory: Negative.     Cardiovascular:  Positive for leg swelling. Negative for chest pain and palpitations.   Gastrointestinal: Negative.    Genitourinary:  Positive for difficulty urinating. Negative for dysuria.   Musculoskeletal:  Positive for arthralgias.   Neurological: Negative.    Psychiatric/Behavioral: Negative.     All other systems reviewed and are negative.      Objective:     Vitals:    03/18/25 1141   BP: 142/58   Pulse: 62       Vitals:    03/18/25 1141   Weight: 97.1 kg (214 lb)       Allergies:     No Known Allergies       Medications:     Current Outpatient Medications   Medication Instructions    albuterol 90 mcg/actuation inhaler 2 puffs, Every 4 hours PRN    atorvastatin (LIPITOR) 40 mg, oral, Daily    carvedilol CR (COREG CR) 80 mg, oral, Daily    clobetasol (Temovate) 0.05 % cream     clobetasol (Temovate) 0.05 % external solution     clobetasoL 0.05 % shampoo     dofetilide (TIKOSYN) 125 mcg, oral, 2 times daily    ergocalciferol (Vitamin D-2) 1250 mcg (50,000 units) capsule TAKE 1 CAPSULE EVERY 14  "DAYS    ergocalciferol (VITAMIN D-2) 50,000 Units, oral, Every 14 days    FreeStyle Cristina 3 [place] misc 1 Device, As needed    furosemide (LASIX) 40 mg, oral, Daily    gabapentin (NEURONTIN) 100 mg, Nightly    insulin lispro (HumaLOG KwikPen Insulin) 100 unit/mL injection INJECT 8 UNITS BEFORE MEALS PLUS SLIDING SCALE. MAXIMUM DAILY DOSE 50 UNITS    Lantus Solostar U-100 Insulin 18 Units, subcutaneous, Daily    magnesium oxide (Mag-Ox) 400 mg (241.3 mg magnesium) tablet 1 tablet, Daily    metFORMIN (GLUCOPHAGE) 1,000 mg, oral, Daily    pen needle, diabetic (BD Ultra-Fine Short Pen Needle) 31 gauge x 5/16\" needle USE 4 PEN NEEDLES DAILY    potassium chloride CR 10 mEq ER tablet 10 mEq, oral, Daily    ramipril (ALTACE) 10 mg, oral, Daily    Spiriva Respimat 2.5 mcg/actuation inhaler 2 puffs, Daily    warfarin (Coumadin) 2.5 mg tablet Take 1 -3 tablets daily as directed based on INR.       Physical Examination:   GENERAL:  Well developed, well nourished, in no acute distress.  HEENT: NC AT, EOMI with anicteric sclera  NECK:  S no JVD, no bruit.  CHEST:  Symmetric and nontender.  LUNGS:  Clear to auscultation bilaterally, normal respiratory effort.  HEART:  PMI is not palpable. RRR with normal S1 and S2, no S3, no mumur or rub. No carotid or abdominal bruits  ABDOMEN: Soft, NT, ND without palpable organomegaly or bruits  EXTREMITIES:  Warm with good color, no clubbing or cyanosis.  There is no edema noted.  PERIPHERAL VASCULAR:  Pulses present and equally palpable; 2+ throughout.  MUSCULOSKELETAL: Osteoarthritic changes  NEURO/PSYCH:  Alert and oriented times three with approppriate behavior and responses. Nonfocal motor examination with normal gait and ambulation  Skin: no rash or lesions on exposed skin or reported.    Lab:     CBC:   Lab Results   Component Value Date    WBC 9.7 03/11/2024    RBC 3.81 (L) 03/11/2024    HGB 11.5 (L) 03/11/2024    HCT 35.6 (L) 03/11/2024     03/11/2024        CMP:    Lab Results " "  Component Value Date     02/11/2025    K 5.2 02/11/2025     02/11/2025    CO2 27 02/11/2025    BUN 32 (H) 02/11/2025    CREATININE 1.64 (H) 02/11/2025    GLUCOSE 122 (H) 02/11/2025    CALCIUM 8.9 02/11/2025       Magnesium:    Lab Results   Component Value Date    MG 1.99 08/31/2023       Lipid Profile:    Lab Results   Component Value Date    TRIG 109 07/30/2024    HDL 41 (A) 07/30/2024    LDLCALC 45 07/30/2024    LDLDIRECT 46 08/31/2023       TSH:    Lab Results   Component Value Date    TSH 3.95 02/11/2025       BNP:   Lab Results   Component Value Date     (H) 03/11/2024        PT/INR:    Lab Results   Component Value Date    PROTIME 23.3 (H) 02/18/2025    INR 2.3 (H) 02/18/2025       HgBA1c:    Lab Results   Component Value Date    HGBA1C 8.6 (H) 02/11/2025       BMP:  Lab Results   Component Value Date     02/11/2025     02/07/2025     09/11/2024     05/14/2024     04/26/2024    K 5.2 02/11/2025    K 5.2 02/07/2025    K 4.4 09/11/2024    K 4.7 05/14/2024    K 4.8 04/26/2024     02/11/2025     02/07/2025     09/11/2024     05/14/2024     04/26/2024    CO2 27 02/11/2025    CO2 28 02/07/2025    CO2 24 09/11/2024    CO2 26 05/14/2024    CO2 25 04/26/2024    BUN 32 (H) 02/11/2025    BUN 38 (H) 02/07/2025    BUN 27 (H) 09/11/2024    BUN 39 (H) 05/14/2024    BUN 35 (H) 04/26/2024    CREATININE 1.64 (H) 02/11/2025    CREATININE 1.89 (H) 02/07/2025    CREATININE 1.56 (H) 09/11/2024    CREATININE 1.85 (H) 05/14/2024    CREATININE 1.68 (H) 04/26/2024       Cardiac Enzymes:    No results found for: \"TROPHS\"    Hepatic Function Panel:    Lab Results   Component Value Date    ALKPHOS 76 02/11/2025    ALT 17 02/11/2025    AST 15 02/11/2025    PROT 6.7 02/11/2025    BILITOT 0.5 02/11/2025     Labs reviewed    Diagnostic Studies:     No echocardiogram results found for the past 12 months    No nuclear medicine results found for the past 12 months. "  Last nuclear stress test August 2020 with normal myocardial perfusion and normal ejection fraction of 74%    No valid procedures specified.    EKG:   EKG from September 2024 reviewed showing sinus bradycardia with right bundle branch block and a QTc interval of 465 ms    Radiology:     No orders to display     ASSESSMENT     Problem List Items Addressed This Visit       Heart failure with preserved ejection fraction, borderline, class III    Relevant Medications    carvedilol CR (Coreg CR) 80 mg 24 hr capsule    Other Relevant Orders    Holter Or Event Cardiac Monitor    Follow Up In Cardiology    Hypertensive heart disease    Relevant Medications    carvedilol CR (Coreg CR) 80 mg 24 hr capsule    Other Relevant Orders    Holter Or Event Cardiac Monitor    Follow Up In Cardiology    Paroxysmal atrial fibrillation (Multi)    Relevant Medications    dofetilide (Tikosyn) 125 mcg capsule    carvedilol CR (Coreg CR) 80 mg 24 hr capsule    Other Relevant Orders    Holter Or Event Cardiac Monitor    Referral to Cardiac Electrophysiology    Follow Up In Cardiology    Chronic anticoagulation    Relevant Orders    CBC    Follow Up In Cardiology    RBBB - Primary    Relevant Orders    Holter Or Event Cardiac Monitor    Follow Up In Cardiology     Other Visit Diagnoses       High risk medication use        Relevant Orders    Holter Or Event Cardiac Monitor    Referral to Cardiac Electrophysiology    Follow Up In Cardiology            PLAN   1.  Paroxysmal atrial fibrillation on chronic anticoagulation and high risk medication use.  Patient has some mild renal insufficiency with some fluctuating creatinines.  Given the use of Tikosyn which is renally excreted I have some concerns.  He has done well on the medication is on the low dose and has not had symptomatic A-fib and over 5 years.  We discussed that his kidney function worsens or progresses we will no longer be able to prescribe this medication.  I have requested a 48-hour  Holter monitor referred him to electrophysiology.  Then the can discuss options with the patient so that were prepared.  Patient voices understanding.  2.  Chronic diastolic heart failure.  Volume status is mildly elevated.  As expected renal function worsens with more aggressive diuresis.  As he is not feeling short of breath right now we will hold with his current therapy.  3.  Hypertension.  Blood pressures are borderline continue his current medical therapy for now  4.  Right bundle branch block.  A chronic finding.    Will check a CBC in August he scheduled to see nephrology still have his renal function checked before then as well we will see what electrophysiology has to stay after review of his Holter monitor and labs.

## 2025-03-18 NOTE — PATIENT INSTRUCTIONS
Get CBC in August  Wear the monitor for 2 days  Make appointment to see heart rhythm specialist  See me back in 6 months if no problem.

## 2025-03-19 ENCOUNTER — ANTICOAGULATION - WARFARIN VISIT (OUTPATIENT)
Dept: CARDIOLOGY | Facility: CLINIC | Age: 80
End: 2025-03-19
Payer: MEDICARE

## 2025-03-20 LAB
INR PPP: 2.8
PROTHROMBIN TIME: 27.8 SEC (ref 9–11.5)

## 2025-03-26 ENCOUNTER — APPOINTMENT (OUTPATIENT)
Dept: CARDIOLOGY | Facility: CLINIC | Age: 80
End: 2025-03-26
Payer: MEDICARE

## 2025-03-26 DIAGNOSIS — I45.10 RBBB: ICD-10-CM

## 2025-03-26 DIAGNOSIS — I50.30 HEART FAILURE WITH PRESERVED EJECTION FRACTION, BORDERLINE, CLASS III: ICD-10-CM

## 2025-03-26 DIAGNOSIS — Z79.899 HIGH RISK MEDICATION USE: ICD-10-CM

## 2025-03-26 DIAGNOSIS — I48.0 PAROXYSMAL ATRIAL FIBRILLATION (MULTI): ICD-10-CM

## 2025-03-26 DIAGNOSIS — I11.9 HYPERTENSIVE HEART DISEASE, UNSPECIFIED WHETHER HEART FAILURE PRESENT: ICD-10-CM

## 2025-04-11 PROCEDURE — 93227 XTRNL ECG REC<48 HR R&I: CPT | Performed by: INTERNAL MEDICINE

## 2025-04-15 ENCOUNTER — TELEPHONE (OUTPATIENT)
Dept: CARDIOLOGY | Facility: CLINIC | Age: 80
End: 2025-04-15
Payer: MEDICARE

## 2025-04-15 NOTE — TELEPHONE ENCOUNTER
----- Message from Jennifer Dyson sent at 4/11/2025  5:39 PM EDT -----  Please call patient with test results.  The patient had a normal heart rhythm with a heart rate that average to mildly slow.  He had extra beats from the atrium that were not high-volume and he had 2 short runs of SVT that were not concerning.  No atrial fibrillation was seen.  He had several pauses or dropped heartbeats but the longest was only 2.4 seconds and not symptomatic.  We only get concerned with pauses that are greater than 3 to 3.5 seconds.  I would have the patient follow-up as I recommended at the last office appointment but this had no concerning changes other than overall his heart rate is on the slow side.

## 2025-04-23 ENCOUNTER — ANTICOAGULATION - WARFARIN VISIT (OUTPATIENT)
Dept: CARDIOLOGY | Facility: CLINIC | Age: 80
End: 2025-04-23
Payer: MEDICARE

## 2025-04-23 LAB
INR PPP: 3.1
PROTHROMBIN TIME: 30.2 SEC (ref 9–11.5)

## 2025-05-12 ENCOUNTER — APPOINTMENT (OUTPATIENT)
Dept: PRIMARY CARE | Facility: CLINIC | Age: 80
End: 2025-05-12
Payer: MEDICARE

## 2025-05-12 DIAGNOSIS — R60.0 BILATERAL LEG EDEMA: ICD-10-CM

## 2025-05-15 RX ORDER — FUROSEMIDE 40 MG/1
40 TABLET ORAL DAILY
Qty: 90 TABLET | Refills: 1 | Status: SHIPPED | OUTPATIENT
Start: 2025-05-15 | End: 2025-11-11

## 2025-05-20 ENCOUNTER — APPOINTMENT (OUTPATIENT)
Facility: CLINIC | Age: 80
End: 2025-05-20
Payer: MEDICARE

## 2025-05-20 VITALS
TEMPERATURE: 97.5 F | WEIGHT: 215 LBS | SYSTOLIC BLOOD PRESSURE: 167 MMHG | HEART RATE: 57 BPM | HEIGHT: 72 IN | DIASTOLIC BLOOD PRESSURE: 81 MMHG | BODY MASS INDEX: 29.12 KG/M2

## 2025-05-20 DIAGNOSIS — E16.2 HYPOGLYCEMIA: ICD-10-CM

## 2025-05-20 DIAGNOSIS — E11.42 TYPE 2 DIABETES MELLITUS WITH DIABETIC POLYNEUROPATHY, WITH LONG-TERM CURRENT USE OF INSULIN: Primary | ICD-10-CM

## 2025-05-20 DIAGNOSIS — Z79.4 TYPE 2 DIABETES MELLITUS WITH DIABETIC POLYNEUROPATHY, WITH LONG-TERM CURRENT USE OF INSULIN: Primary | ICD-10-CM

## 2025-05-20 DIAGNOSIS — E11.9 TYPE 2 DIABETES MELLITUS WITHOUT COMPLICATION, UNSPECIFIED WHETHER LONG TERM INSULIN USE: ICD-10-CM

## 2025-05-20 DIAGNOSIS — E78.5 HYPERLIPIDEMIA, UNSPECIFIED HYPERLIPIDEMIA TYPE: ICD-10-CM

## 2025-05-20 DIAGNOSIS — Z97.8 USES SELF-APPLIED CONTINUOUS GLUCOSE MONITORING DEVICE: ICD-10-CM

## 2025-05-20 LAB — POC HEMOGLOBIN A1C: 7.8 % (ref 4.2–6.5)

## 2025-05-20 PROCEDURE — 83036 HEMOGLOBIN GLYCOSYLATED A1C: CPT | Performed by: STUDENT IN AN ORGANIZED HEALTH CARE EDUCATION/TRAINING PROGRAM

## 2025-05-20 PROCEDURE — 95251 CONT GLUC MNTR ANALYSIS I&R: CPT | Performed by: STUDENT IN AN ORGANIZED HEALTH CARE EDUCATION/TRAINING PROGRAM

## 2025-05-20 PROCEDURE — 1159F MED LIST DOCD IN RCRD: CPT | Performed by: STUDENT IN AN ORGANIZED HEALTH CARE EDUCATION/TRAINING PROGRAM

## 2025-05-20 PROCEDURE — 99204 OFFICE O/P NEW MOD 45 MIN: CPT | Performed by: STUDENT IN AN ORGANIZED HEALTH CARE EDUCATION/TRAINING PROGRAM

## 2025-05-20 RX ORDER — INSULIN GLARGINE 100 [IU]/ML
18 INJECTION, SOLUTION SUBCUTANEOUS DAILY
Qty: 6 ML | Refills: 11 | Status: SHIPPED | OUTPATIENT
Start: 2025-05-20

## 2025-05-20 RX ORDER — INSULIN LISPRO 100 [IU]/ML
INJECTION, SOLUTION INTRAVENOUS; SUBCUTANEOUS
Qty: 45 ML | Refills: 1 | Status: SHIPPED | OUTPATIENT
Start: 2025-05-20

## 2025-05-20 RX ORDER — PEN NEEDLE, DIABETIC 30 GX3/16"
NEEDLE, DISPOSABLE MISCELLANEOUS
Qty: 400 EACH | Refills: 3 | Status: SHIPPED | OUTPATIENT
Start: 2025-05-20

## 2025-05-20 NOTE — PATIENT INSTRUCTIONS
Decrease lantus to 18 units daily  Change humalog to 8 units before breakfast and lunch and 10 units before dinner and add insulin sliding scale according to the following and according to your meal  B-200 take 2 units of Humalog  B-250 take 4 units of Humalog  B-300 take 6 units of Humalog  B-350 take 8 units of Humalog  BG > 351 take 10units      Continue atorvastatin 40 mg daily  Continue ramipril   We will consider GLP1 RA next visit  Blood work before next visit  RTC in Oct/November

## 2025-05-20 NOTE — PROGRESS NOTES
Subjective   Servando Wells is a 80 y.o. male who presents for establishing care for Type 2 diabetes mellitus.   The initial diagnosis of diabetes was made 15 yrs. Father had diabetes  Lab Results   Component Value Date    HGBA1C 7.8 (A) 05/20/2025       Type II DM, well controlled, on MDI A1c  7.6 ,  on  MDI and Metformin, complicated  with diabetic neuropathy ( s/p laser treatment ) and hypoglycemia. Goal A1c < 7.5    HLD well controlled on atorvasatin 40 mg   HTN controlled on ACE      Interval hx:  Used to follow with Dr. Hans Dias  Current Diabetes meds:  Lantus 20 units at night ( currently taking dose between 18-22 units)  humlaog  8 units with meals, plus sliding scale SS2  Metformin 1000 mg OD    The patient is currently checking the blood glucose 5 times per day.    Patient is using: continuous glucose monitor  Carries orange juice   Hypoglycemia frequency: frequent especially overnight  Hypoglycemia awareness: Yes     Diet:  Eats 3 meals per day and snacks  Sometimes drinks juice    Known complications due to diabetes included nephropathy and peripheral neuropathy    Cardiovascular risk factors include advanced age (older than 55 for men, 65 for women), diabetes mellitus, dyslipidemia, hypertension, male gender, and microalbuminuria. The patient is on an ACE inhibitor or angiotensin II receptor blocker.      Foot Exam: dr. Burris    Eye Exam: Per patient a year ago. p  Lipid Panel: On atorvastatin 40 mg LDL 45  Urine Albumin: UACR positive on rampril       Meds reviewed    FHX   Father     Review of Systems  all pertinent systems reviewed and are otherwise negative   Objective   /81 (BP Location: Left arm, Patient Position: Sitting, BP Cuff Size: Large adult)   Pulse 57   Temp 36.4 °C (97.5 °F)   Ht 1.829 m (6')   Wt 97.5 kg (215 lb)   BMI 29.16 kg/m²   Physical Exam  Constitutional:       General: He is not in acute distress.     Appearance: Normal appearance.      Comments: On a walker    HENT:      Head: Normocephalic and atraumatic.   Eyes:      Extraocular Movements: Extraocular movements intact.      Pupils: Pupils are equal, round, and reactive to light.   Cardiovascular:      Rate and Rhythm: Normal rate and regular rhythm.   Pulmonary:      Effort: Pulmonary effort is normal. No respiratory distress.      Breath sounds: Normal breath sounds.   Abdominal:      General: Bowel sounds are normal.      Palpations: Abdomen is soft.      Tenderness: There is no abdominal tenderness.   Skin:     Coloration: Skin is not jaundiced or pale.      Findings: No erythema or rash.   Neurological:      General: No focal deficit present.      Mental Status: He is alert and oriented to person, place, and time.      Deep Tendon Reflexes: Reflexes normal.   Psychiatric:         Mood and Affect: Mood normal.         Behavior: Behavior normal.       Lab Review  GLUCOSE (mg/dL)   Date Value   02/11/2025 122 (H)   02/07/2025 268 (H)     Glucose (mg/dL)   Date Value   09/11/2024 127 (H)   05/14/2024 182 (H)   04/26/2024 85     POC HEMOGLOBIN A1c (%)   Date Value   05/20/2025 7.8 (A)   11/11/2024 7.6 (A)   07/23/2024 7.6 (A)     HEMOGLOBIN A1c (% of total Hgb)   Date Value   02/11/2025 8.6 (H)     CARBON DIOXIDE (mmol/L)   Date Value   02/11/2025 27   02/07/2025 28     Bicarbonate (mmol/L)   Date Value   09/11/2024 24   05/14/2024 26   04/26/2024 25     UREA NITROGEN (BUN) (mg/dL)   Date Value   02/11/2025 32 (H)   02/07/2025 38 (H)     Urea Nitrogen (mg/dL)   Date Value   09/11/2024 27 (H)   05/14/2024 39 (H)   04/26/2024 35 (H)     Creatinine (mg/dL)   Date Value   09/11/2024 1.56 (H)   05/14/2024 1.85 (H)   04/26/2024 1.68 (H)     CREATININE (mg/dL)   Date Value   02/11/2025 1.64 (H)   02/07/2025 1.89 (H)     Lab Results   Component Value Date    CHOL 108 07/30/2024    CHOL 99 08/31/2023    CHOL 105 08/29/2022     Lab Results   Component Value Date    HDL 41 (A) 07/30/2024    HDL 41.6 08/31/2023    HDL 33.0 (A)  "08/29/2022     Lab Results   Component Value Date    LDLCALC 45 07/30/2024     Lab Results   Component Value Date    TRIG 109 07/30/2024    TRIG 109 08/31/2023    TRIG 156 (H) 08/29/2022     No components found for: \"CHOLHDL\"   Lab Results   Component Value Date    TSH 3.95 02/11/2025       Assessment/Plan   Servando Wells is a 80 y.o. male who presents for establishing care for Type 2 diabetes mellitus.   A1c May 2025 7.8%  Current Diabetes meds:  Lantus 20 units at night ( currently taking dose between 18-22 units)  humlaog  8 units with meals, plus sliding scale SS2  Metformin 1000 mg OD  AVG BG: 160   TIR: 63% and 3 % low mainly low overnight or early morning.  Foot Exam: dr. Burris  last in March 2025  Eye Exam: Per patient a year ago. p  Lipid Panel: On atorvastatin 40 mg LDL 45  Urine Albumin: UACR positive on rampril     Plan  Decrease lantus to 18 units daily  Change humalog to 8 units before breakfast and lunch and 10 units before dinner and ISS 2 units for every 50>150 mg/dl  Continue atorvastatin 40 mg daily  Continue ramipril   We will consider GLP1 RA next visit  Blood work before next visit  RTC in Oct/November    Assessment & Plan  Type 2 diabetes mellitus with diabetic polyneuropathy, with long-term current use of insulin    Orders:    pen needle, diabetic (BD Ultra-Fine Short Pen Needle) 31 gauge x 5/16\" needle; Use with 4-5 injections of insulin    insulin glargine (Lantus Solostar U-100 Insulin) 100 unit/mL (3 mL) pen; Inject 18 Units under the skin once daily.    insulin lispro (HumaLOG KwikPen Insulin) 100 unit/mL pen; Take 8-8-10 before meals with sliding scale Take as directed per insulin instructions.    Albumin-Creatinine Ratio, Urine Random; Future    Hemoglobin A1C; Future    Lipid Panel; Future    Renal Function Panel; Future    TSH with reflex to Free T4 if abnormal; Future    Follow Up In Endocrinology; Future    Uses self-applied continuous glucose monitoring device    Orders:    pen " "needle, diabetic (BD Ultra-Fine Short Pen Needle) 31 gauge x 5/16\" needle; Use with 4-5 injections of insulin    insulin glargine (Lantus Solostar U-100 Insulin) 100 unit/mL (3 mL) pen; Inject 18 Units under the skin once daily.    insulin lispro (HumaLOG KwikPen Insulin) 100 unit/mL pen; Take 8-8-10 before meals with sliding scale Take as directed per insulin instructions.    Albumin-Creatinine Ratio, Urine Random; Future    Hemoglobin A1C; Future    Lipid Panel; Future    Renal Function Panel; Future    TSH with reflex to Free T4 if abnormal; Future    Follow Up In Endocrinology; Future    Hyperlipidemia, unspecified hyperlipidemia type    Orders:    Lipid Panel; Future    Follow Up In Endocrinology; Future    Hypoglycemia    Orders:    Hemoglobin A1C; Future    Follow Up In Endocrinology; Future      "

## 2025-05-27 ENCOUNTER — HOSPITAL ENCOUNTER (OUTPATIENT)
Dept: RADIOLOGY | Facility: HOSPITAL | Age: 80
Discharge: HOME | End: 2025-05-27
Payer: MEDICARE

## 2025-05-27 ENCOUNTER — ANTICOAGULATION - WARFARIN VISIT (OUTPATIENT)
Dept: CARDIOLOGY | Facility: CLINIC | Age: 80
End: 2025-05-27

## 2025-05-27 DIAGNOSIS — J84.9 INTERSTITIAL PULMONARY DISEASE, UNSPECIFIED: ICD-10-CM

## 2025-05-27 PROCEDURE — 71250 CT THORAX DX C-: CPT

## 2025-05-27 PROCEDURE — 71250 CT THORAX DX C-: CPT | Performed by: RADIOLOGY

## 2025-05-28 LAB
INR PPP: 2.7
PROTHROMBIN TIME: 26.6 SEC (ref 9–11.5)

## 2025-06-18 ENCOUNTER — HOSPITAL ENCOUNTER (OUTPATIENT)
Dept: RADIOLOGY | Facility: HOSPITAL | Age: 80
Discharge: HOME | End: 2025-06-18
Payer: MEDICARE

## 2025-06-18 DIAGNOSIS — N20.0 CALCULUS OF KIDNEY: ICD-10-CM

## 2025-06-18 PROCEDURE — 74018 RADEX ABDOMEN 1 VIEW: CPT | Performed by: STUDENT IN AN ORGANIZED HEALTH CARE EDUCATION/TRAINING PROGRAM

## 2025-06-18 PROCEDURE — 74018 RADEX ABDOMEN 1 VIEW: CPT

## 2025-06-25 ENCOUNTER — ANTICOAGULATION - WARFARIN VISIT (OUTPATIENT)
Dept: CARDIOLOGY | Facility: CLINIC | Age: 80
End: 2025-06-25
Payer: MEDICARE

## 2025-06-26 LAB
INR PPP: 3.9
PROTHROMBIN TIME: 37.2 SEC (ref 9–11.5)

## 2025-06-28 NOTE — PROGRESS NOTES
Patient ID: Servando Wells is a 80 y.o. male who presents for Consult (He is referred by Dr. Dyson for atrial fibrillation.).  History of Present Illness  He is referred by Dr. Dyson for atrial fibrillation.    Servando Wells is a 80 year old male with atrial fibrillation who presents for a consultation regarding treatment options. He was referred by Dr. Goodwin to discuss treatment options for atrial fibrillation.    Atrial fibrillation was diagnosed approximately 10 to 15 years ago. There have been no recent episodes hospital discharge. He maintains normal rhythm with dofetilide, which he tolerates well.  Patient denies any arrhythmia symptoms of palpitation, lightheadedness, near syncope, or syncope.      He is on warfarin for anticoagulation without bleeding issues. His INR was recently 3.9, prompting a temporary warfarin dosage adjustment. Another INR check is scheduled for next week.    He takes furosemide for fluid management, usually one pill, increasing to two if swelling in his feet occurs. Compression stockings effectively manage this swelling. No chest pain.    PMHx:  See list    PSHx:  See list    Social Hx:  Social History[1]    Family Hx:  Family History[2]    Review of Systems   Cardiovascular:  Negative for chest pain, dyspnea on exertion and palpitations.   All other systems reviewed and are negative.        Objective     /60 (BP Location: Left arm, Patient Position: Sitting)   Pulse 57   Ht 1.829 m (6')   Wt 97.1 kg (214 lb)   BMI 29.02 kg/m²        LABS:  CBC:   Lab Results   Component Value Date    WBC 9.7 03/11/2024    RBC 3.81 (L) 03/11/2024    HGB 11.5 (L) 03/11/2024    HCT 35.6 (L) 03/11/2024    MCV 93 03/11/2024    MCH 30.2 03/11/2024    MCHC 32.3 03/11/2024    RDW 13.4 03/11/2024     03/11/2024     CBC with Differential:    Lab Results   Component Value Date    WBC 9.7 03/11/2024    RBC 3.81 (L) 03/11/2024    HGB 11.5 (L) 03/11/2024    HCT 35.6 (L)  03/11/2024     03/11/2024    MCV 93 03/11/2024    MCH 30.2 03/11/2024    MCHC 32.3 03/11/2024    RDW 13.4 03/11/2024    NRBC 0.0 03/11/2024    LYMPHOPCT 18.6 08/31/2023    MONOPCT 7.2 08/31/2023    EOSPCT 6.5 08/31/2023    BASOPCT 0.7 08/31/2023    MONOSABS 0.52 08/31/2023    LYMPHSABS 1.34 08/31/2023    EOSABS 0.47 (H) 08/31/2023    BASOSABS 0.05 08/31/2023     CMP:    Lab Results   Component Value Date     02/11/2025    K 5.2 02/11/2025     02/11/2025    CO2 27 02/11/2025    BUN 32 (H) 02/11/2025    CREATININE 1.64 (H) 02/11/2025    GLUCOSE 122 (H) 02/11/2025    PROT 6.7 02/11/2025    CALCIUM 8.9 02/11/2025    BILITOT 0.5 02/11/2025    ALKPHOS 76 02/11/2025    AST 15 02/11/2025    ALT 17 02/11/2025     BMP:    Lab Results   Component Value Date     02/11/2025    K 5.2 02/11/2025     02/11/2025    CO2 27 02/11/2025    BUN 32 (H) 02/11/2025    CREATININE 1.64 (H) 02/11/2025    CALCIUM 8.9 02/11/2025    GLUCOSE 122 (H) 02/11/2025     Magnesium:  Lab Results   Component Value Date    MG 1.99 08/31/2023           Physical Exam  Constitutional:       General: Awake.      Appearance: Normal and healthy appearance. Well-developed and not in distress.   Neck:      Vascular: No JVR. JVD normal.   Pulmonary:      Effort: Pulmonary effort is normal.      Breath sounds: Normal breath sounds. No wheezing. No rhonchi. No rales.   Chest:      Chest wall: Not tender to palpatation.   Cardiovascular:      PMI at left midclavicular line. Normal rate. Regular rhythm. Normal S1. Normal S2.       Murmurs: There is no murmur.      No gallop.  No click. No rub.   Pulses:     Intact distal pulses.   Edema:     Peripheral edema absent.   Abdominal:      Tenderness: There is no abdominal tenderness.   Musculoskeletal: Normal range of motion.         General: No tenderness. Skin:     General: Skin is warm and dry.   Neurological:      General: No focal deficit present.      Mental Status: Alert and oriented to  person, place and time.         ECG. See scanned.    Assessment & Plan  Paroxysmal Atrial Fibrillation  Atrial fibrillation controlled with dofetilide. Potential medication adjustment needed if kidney function declines. Warfarin dose adjustment required due to INR of 3.9. Discussed Watchman device as future option if anticoagulation issues arise. He opts for conservative treatment and declines ablation. I did briefly review cryoPVI brochure with him, and ablation is not of interest to him.  - Continue dofetilide as long as kidney function remains stable.  - Continue warfarin with regular INR monitoring.  - Consider Watchman device if anticoagulation becomes problematic in the future.    Chronic kidney disease, stage III.  Chronic kidney disease affects dofetilide metabolism. Cr followed and managed by nephrologist Dr. Sosa.  - Continue monitoring kidney function with nephrologist Dr. Sosa.  - Discussed with patient that if needed in future, could consider change to other antiarrhythmic (amiodarone)    Chronic diastolic heart failure. Volume status is mildly elevated . Edema  Intermittent edema managed with furosemide and compression stockings. Symptoms resolve with current management.  - Continue furosemide as needed for edema.  - Use compression stockings to manage edema.  Paroxysmal atrial fibrillation on chronic anticoagulation and high risk medication use with dofetilide      Counseled greater than 50% of the visit.  The patient and I discussed arrhythmia, ECG, shared decision making, atrial fibrillation, kidney function and metabolism of dofetilide, other potential option of antiarrhythmic, opts for conservative care, declines ablation, declines Watchman, anticoagulation types of medications, treatment options, risk, benefits, and imponderables.  Lifestyle modifications reviewed.  All questions answered.  Patient appreciative of care  Assessment & Plan  Paroxysmal atrial fibrillation (Multi)    High risk  medication use    RBBB    Mixed hyperlipidemia    Chronic anticoagulation    Former smoker    BMI 29.0-29.9,adult    Encounter for medication review and counseling    Encounter to discuss treatment options    Encounter to establish care with new doctor        This medical note was created with the assistance of artificial intelligence (AI) for documentation purposes. The content has been reviewed and confirmed by the healthcare provider for accuracy and completeness. Patient consented to the use of audio recording and use of AI during their visit.     I, , personally performed the services described in the documentation as scribed by the nurse in my presence, and confirm it is both accurate and complete.     Total, 62 inclusive of review of cardiology office notes, ECG's, and testing       [1]   Social History  Socioeconomic History    Marital status:    Tobacco Use    Smoking status: Former     Current packs/day: 0.00     Average packs/day: 1 pack/day for 22.0 years (22.0 ttl pk-yrs)     Types: Cigarettes     Start date:      Quit date:      Years since quittin.5    Smokeless tobacco: Never   Substance and Sexual Activity    Alcohol use: Yes     Comment: once a month    Drug use: Not Currently   [2]   Family History  Problem Relation Name Age of Onset    Hypertension Father      Diabetes Father

## 2025-06-30 ENCOUNTER — APPOINTMENT (OUTPATIENT)
Dept: CARDIOLOGY | Facility: CLINIC | Age: 80
End: 2025-06-30
Payer: MEDICARE

## 2025-06-30 VITALS
BODY MASS INDEX: 28.99 KG/M2 | SYSTOLIC BLOOD PRESSURE: 128 MMHG | DIASTOLIC BLOOD PRESSURE: 60 MMHG | HEIGHT: 72 IN | WEIGHT: 214 LBS | HEART RATE: 57 BPM

## 2025-06-30 DIAGNOSIS — Z87.891 FORMER SMOKER: ICD-10-CM

## 2025-06-30 DIAGNOSIS — Z71.89 ENCOUNTER TO DISCUSS TREATMENT OPTIONS: ICD-10-CM

## 2025-06-30 DIAGNOSIS — I45.10 RBBB: ICD-10-CM

## 2025-06-30 DIAGNOSIS — Z79.899 HIGH RISK MEDICATION USE: Primary | ICD-10-CM

## 2025-06-30 DIAGNOSIS — Z76.89 ENCOUNTER TO ESTABLISH CARE WITH NEW DOCTOR: ICD-10-CM

## 2025-06-30 DIAGNOSIS — I48.0 PAROXYSMAL ATRIAL FIBRILLATION (MULTI): ICD-10-CM

## 2025-06-30 DIAGNOSIS — Z71.89 ENCOUNTER FOR MEDICATION REVIEW AND COUNSELING: ICD-10-CM

## 2025-06-30 DIAGNOSIS — E78.2 MIXED HYPERLIPIDEMIA: ICD-10-CM

## 2025-06-30 DIAGNOSIS — Z79.01 CHRONIC ANTICOAGULATION: ICD-10-CM

## 2025-06-30 PROCEDURE — 93000 ELECTROCARDIOGRAM COMPLETE: CPT | Performed by: INTERNAL MEDICINE

## 2025-06-30 PROCEDURE — 1159F MED LIST DOCD IN RCRD: CPT | Performed by: INTERNAL MEDICINE

## 2025-06-30 PROCEDURE — 1036F TOBACCO NON-USER: CPT | Performed by: INTERNAL MEDICINE

## 2025-06-30 PROCEDURE — 99204 OFFICE O/P NEW MOD 45 MIN: CPT | Performed by: INTERNAL MEDICINE

## 2025-06-30 ASSESSMENT — ENCOUNTER SYMPTOMS
PALPITATIONS: 0
DYSPNEA ON EXERTION: 0

## 2025-06-30 NOTE — PATIENT INSTRUCTIONS
Continue same medications/treatment.  Patient educated on proper medication use.  Patient educated on risk factor modification.  Please bring any lab results from other providers/physicians to your next appointment.    Please bring all medicines, vitamins, and herbal supplements with you when you come to the office.    Prescriptions will not be filled unless you are compliant with your follow up appointments or have a follow up appointment scheduled as per instruction of your physician. Refills should be requested at the time of your visit.    Follow up with Dr. Roman in 1 year    ALEXEY JOYA RN, AM SCRIBING FOR AND IN THE PRESENCE OF DR. JAZMIN ROMAN MD, FACC, FACP, RS

## 2025-07-07 ENCOUNTER — ANTICOAGULATION - WARFARIN VISIT (OUTPATIENT)
Dept: CARDIOLOGY | Facility: CLINIC | Age: 80
End: 2025-07-07
Payer: MEDICARE

## 2025-07-07 LAB
INR PPP: 3.1
PROTHROMBIN TIME: 30.4 SEC (ref 9–11.5)

## 2025-07-23 DIAGNOSIS — I48.0 PAROXYSMAL ATRIAL FIBRILLATION (MULTI): ICD-10-CM

## 2025-07-23 RX ORDER — WARFARIN 2.5 MG/1
TABLET ORAL
Qty: 270 TABLET | Refills: 1 | Status: SHIPPED | OUTPATIENT
Start: 2025-07-23

## 2025-08-01 ENCOUNTER — APPOINTMENT (OUTPATIENT)
Dept: PRIMARY CARE | Facility: CLINIC | Age: 80
End: 2025-08-01
Payer: COMMERCIAL

## 2025-08-01 VITALS
TEMPERATURE: 97.7 F | WEIGHT: 215 LBS | BODY MASS INDEX: 29.12 KG/M2 | DIASTOLIC BLOOD PRESSURE: 67 MMHG | HEIGHT: 72 IN | SYSTOLIC BLOOD PRESSURE: 140 MMHG

## 2025-08-01 DIAGNOSIS — R20.8 DECREASED SENSATION OF FOOT: ICD-10-CM

## 2025-08-01 DIAGNOSIS — E78.2 MIXED HYPERLIPIDEMIA: ICD-10-CM

## 2025-08-01 DIAGNOSIS — M47.816 OSTEOARTHRITIS OF LUMBAR SPINE, UNSPECIFIED SPINAL OSTEOARTHRITIS COMPLICATION STATUS: ICD-10-CM

## 2025-08-01 DIAGNOSIS — I51.89 DIASTOLIC DYSFUNCTION: ICD-10-CM

## 2025-08-01 DIAGNOSIS — I13.10 HYPERTENSIVE HEART DISEASE WITH HYPERTENSIVE CHRONIC KIDNEY DISEASE, UNSPECIFIED CKD STAGE, UNSPECIFIED WHETHER HEART FAILURE PRESENT: ICD-10-CM

## 2025-08-01 DIAGNOSIS — R20.8 DECREASED VIBRATORY SENSE: ICD-10-CM

## 2025-08-01 DIAGNOSIS — E11.40 TYPE 2 DIABETES MELLITUS WITH DIABETIC NEUROPATHY, WITH LONG-TERM CURRENT USE OF INSULIN: Primary | ICD-10-CM

## 2025-08-01 DIAGNOSIS — Z79.4 TYPE 2 DIABETES MELLITUS WITH DIABETIC NEUROPATHY, WITH LONG-TERM CURRENT USE OF INSULIN: Primary | ICD-10-CM

## 2025-08-01 DIAGNOSIS — I11.9 HYPERTENSIVE HEART DISEASE, UNSPECIFIED WHETHER HEART FAILURE PRESENT: ICD-10-CM

## 2025-08-01 DIAGNOSIS — M16.0 OSTEOARTHRITIS OF BOTH HIPS, UNSPECIFIED OSTEOARTHRITIS TYPE: ICD-10-CM

## 2025-08-01 DIAGNOSIS — L30.9 ECZEMA, UNSPECIFIED TYPE: ICD-10-CM

## 2025-08-01 DIAGNOSIS — C67.9 MALIGNANT NEOPLASM OF URINARY BLADDER, UNSPECIFIED SITE (MULTI): ICD-10-CM

## 2025-08-01 DIAGNOSIS — M50.03 CERVICAL DISC DISORDER WITH MYELOPATHY, CERVICOTHORACIC REGION: ICD-10-CM

## 2025-08-01 PROBLEM — Z76.89 ENCOUNTER TO ESTABLISH CARE WITH NEW DOCTOR: Status: RESOLVED | Noted: 2025-06-30 | Resolved: 2025-08-01

## 2025-08-01 PROBLEM — L97.321 SKIN ULCER OF LEFT ANKLE, LIMITED TO BREAKDOWN OF SKIN: Status: RESOLVED | Noted: 2024-07-30 | Resolved: 2025-08-01

## 2025-08-01 PROBLEM — Z71.89 ENCOUNTER TO DISCUSS TREATMENT OPTIONS: Status: RESOLVED | Noted: 2025-06-30 | Resolved: 2025-08-01

## 2025-08-01 PROBLEM — Z71.89 ENCOUNTER FOR MEDICATION REVIEW AND COUNSELING: Status: RESOLVED | Noted: 2025-06-30 | Resolved: 2025-08-01

## 2025-08-01 LAB
POC FINGERSTICK BLOOD GLUCOSE: 181 MG/DL (ref 70–100)
POC HDL CHOLESTEROL: 41 MG/DL (ref 0–40)
POC LDL CHOLESTEROL: 45 MG/DL (ref 0–100)
POC NON-HDL CHOLESTEROL: 66 MG/DL (ref 0–130)
POC TOTAL CHOLESTEROL/HDL RATIO: 2.6 (ref 0–4.5)
POC TOTAL CHOLESTEROL: 106 MG/DL (ref 0–199)
POC TRIGLYCERIDES: 105 MG/DL (ref 0–150)

## 2025-08-01 PROCEDURE — 1170F FXNL STATUS ASSESSED: CPT | Performed by: FAMILY MEDICINE

## 2025-08-01 PROCEDURE — G0444 DEPRESSION SCREEN ANNUAL: HCPCS | Performed by: FAMILY MEDICINE

## 2025-08-01 PROCEDURE — G0439 PPPS, SUBSEQ VISIT: HCPCS | Performed by: FAMILY MEDICINE

## 2025-08-01 PROCEDURE — 1159F MED LIST DOCD IN RCRD: CPT | Performed by: FAMILY MEDICINE

## 2025-08-01 PROCEDURE — 80061 LIPID PANEL: CPT | Performed by: FAMILY MEDICINE

## 2025-08-01 PROCEDURE — 1123F ACP DISCUSS/DSCN MKR DOCD: CPT | Performed by: FAMILY MEDICINE

## 2025-08-01 PROCEDURE — 99214 OFFICE O/P EST MOD 30 MIN: CPT | Performed by: FAMILY MEDICINE

## 2025-08-01 PROCEDURE — 1160F RVW MEDS BY RX/DR IN RCRD: CPT | Performed by: FAMILY MEDICINE

## 2025-08-01 PROCEDURE — 82962 GLUCOSE BLOOD TEST: CPT | Performed by: FAMILY MEDICINE

## 2025-08-01 PROCEDURE — G0442 ANNUAL ALCOHOL SCREEN 15 MIN: HCPCS | Performed by: FAMILY MEDICINE

## 2025-08-01 ASSESSMENT — ACTIVITIES OF DAILY LIVING (ADL)
MANAGING_FINANCES: NEEDS ASSISTANCE
TAKING_MEDICATION: NEEDS ASSISTANCE
DOING_HOUSEWORK: NEEDS ASSISTANCE
DRESSING: INDEPENDENT
BATHING: INDEPENDENT
GROCERY_SHOPPING: NEEDS ASSISTANCE

## 2025-08-01 ASSESSMENT — ENCOUNTER SYMPTOMS
LOSS OF SENSATION IN FEET: 1
DEPRESSION: 0
OCCASIONAL FEELINGS OF UNSTEADINESS: 1

## 2025-08-01 NOTE — PROGRESS NOTES
Subjective   Patient ID: [id number]     Servando Wells is a 80 y.o. male who presents for Medicare Annual Wellness Visit Subsequent.    In the past 2 weeks this patient has not felt down, depressed, hopeless, lost interest or pleasure in doing things or thought of harming themself or others and this was discussed over five minutes. The patient has not fallen in the last six months and has no loose rugs,  uneven floors or poor lighting at home.  Advance Care Planning discussion was held over 5 minutes.  The patient has no spiritual/Hinduism/cultural values or needs that we need to know. The patient does not feel threatened or abused physically, emotionally or sexually.  The patient feels safe in the home.  In the past month, there was not a day when the patient of anyone in the patient's family went hungry because there was not enough food.  The patient denies that they or the person with them has problems with hearing, speaking, reading, moving around or learning.  The patient states they are comfortable filling out medical forms. Alcohol usage was dicussed over five minutes.    HPI  Taking meds as directed without tolerability or affordability issues; no complaints today.  Just had bladder check by Dr Burch    Review of Systems  ENDO- No change in hair, voice, skin, weight or temperature tolerance   MSK-No locking, giving way/swelling of joints  NEURO- No daily headaches, hx of concussion, fall or seizure in the last year   DERM-No  blanching or  change in any moles;  dry skin on hi slower legs    Objective     /67 (BP Location: Left arm, Patient Position: Sitting)   Temp 36.5 °C (97.7 °F) (Oral)   Ht 1.829 m (6')   Wt 97.5 kg (215 lb) Comment: PATIENT REPORTED  BMI 29.16 kg/m²      Physical Exam  GEN- well defined male in NAD with rollator  Neck-supple without lymphadenopathy or thyromegaly; no carotid bruits  Heart- regular rate and rhythm, normal s1 and s2 without murmur or gallop  Lungs-clear to  auscultation  Abdomen-soft, positive bowel sounds, without masses, HSmegaly or pain   Foot Exam-normal propioceptive, vibration and monofilament;  normal pulses, temperature and reflexes; normal hair distribution, skin pink with patches of eczema;  decreased vibratory in whole legs and decreased sensation in all feet    Assessment/Plan     Problem List Items Addressed This Visit       Cervical disc disorder with myelopathy, cervicothoracic region    Degenerative joint disease (DJD) of lumbar spine    Degenerative joint disease of both hips    Type 2 diabetes mellitus with diabetic neuropathy, with long-term current use of insulin - Primary    Dr Finch         Relevant Orders    Referral to Clinical Pharmacy    POCT Lipid Panel manually resulted    POCT fingerstick glucose manually resulted    Diastolic dysfunction    Dr Armas         Relevant Orders    Transthoracic Echo Limited    Hyperlipidemia    Hypertensive heart and chronic kidney disease    Hypertensive heart disease    Malignant neoplasm of urinary bladder (Multi)    Fabian Burch MD         Decreased vibratory sense    Decreased sensation of foot    Eczema    Relevant Medications    mineral oil-hydrophilic petrolatum (Aquaphor) ointment     See your eye doctor for a dilated eye exam at least every year to screen for the gradual loss of vision that can occur with Glaucoma and the potentially catastrophic effects of Diabetes.    You are eligible for the COVID booster.  Without a recent (within 90 days) challenge (booster or infection) your immune system will be three days behind in protecting you from the infection.  You will infect approximately five people by that time.    Follow up fasting (no alcohol for 48 hours and just water for 14 hours) in three months for your next routine appointment.  In general, take any medications on schedule (except for types of Insulin).        Puma Mahan MD

## 2025-08-01 NOTE — PATIENT INSTRUCTIONS
See your eye doctor for a dilated eye exam at least every year to screen for the gradual loss of vision that can occur with Glaucoma and the potentially catastrophic effects of Diabetes.    You are eligible for the COVID booster.  Without a recent (within 90 days) challenge (booster or infection) your immune system will be three days behind in protecting you from the infection.  You will infect approximately five people by that time.    Follow up fasting (no alcohol for 48 hours and just water for 14 hours) in three months for your next routine appointment.  In general, take any medications on schedule (except for types of Insulin).

## 2025-08-05 ENCOUNTER — ANTICOAGULATION - WARFARIN VISIT (OUTPATIENT)
Dept: CARDIOLOGY | Facility: CLINIC | Age: 80
End: 2025-08-05
Payer: MEDICARE

## 2025-08-06 ENCOUNTER — APPOINTMENT (OUTPATIENT)
Dept: NEPHROLOGY | Facility: CLINIC | Age: 80
End: 2025-08-06
Payer: MEDICARE

## 2025-08-06 LAB
INR PPP: 2.2
PROTHROMBIN TIME: 22 SEC (ref 9–11.5)

## 2025-08-13 ENCOUNTER — APPOINTMENT (OUTPATIENT)
Dept: NEPHROLOGY | Facility: CLINIC | Age: 80
End: 2025-08-13
Payer: MEDICARE

## 2025-08-13 VITALS
HEART RATE: 56 BPM | SYSTOLIC BLOOD PRESSURE: 145 MMHG | BODY MASS INDEX: 29.12 KG/M2 | DIASTOLIC BLOOD PRESSURE: 66 MMHG | WEIGHT: 215 LBS | HEIGHT: 72 IN

## 2025-08-13 DIAGNOSIS — E08.22 DIABETES MELLITUS DUE TO UNDERLYING CONDITION WITH DIABETIC CHRONIC KIDNEY DISEASE, UNSPECIFIED CKD STAGE, UNSPECIFIED WHETHER LONG TERM INSULIN USE: ICD-10-CM

## 2025-08-13 DIAGNOSIS — I10 ESSENTIAL HYPERTENSION, BENIGN: Primary | ICD-10-CM

## 2025-08-13 DIAGNOSIS — N20.0 NEPHROLITHIASIS: ICD-10-CM

## 2025-08-13 DIAGNOSIS — N18.32 STAGE 3B CHRONIC KIDNEY DISEASE (MULTI): ICD-10-CM

## 2025-08-13 DIAGNOSIS — E21.3 HYPERPARATHYROIDISM (MULTI): ICD-10-CM

## 2025-08-13 DIAGNOSIS — I10 ESSENTIAL HYPERTENSION: ICD-10-CM

## 2025-08-13 PROCEDURE — 1159F MED LIST DOCD IN RCRD: CPT | Performed by: INTERNAL MEDICINE

## 2025-08-13 PROCEDURE — 99214 OFFICE O/P EST MOD 30 MIN: CPT | Performed by: INTERNAL MEDICINE

## 2025-08-13 PROCEDURE — 3077F SYST BP >= 140 MM HG: CPT | Performed by: INTERNAL MEDICINE

## 2025-08-13 PROCEDURE — 3078F DIAST BP <80 MM HG: CPT | Performed by: INTERNAL MEDICINE

## 2025-08-13 RX ORDER — AMLODIPINE BESYLATE 5 MG/1
5 TABLET ORAL DAILY
Qty: 90 TABLET | Refills: 3 | Status: SHIPPED | OUTPATIENT
Start: 2025-08-13 | End: 2026-08-13

## 2025-08-14 ENCOUNTER — APPOINTMENT (OUTPATIENT)
Dept: PHARMACY | Facility: HOSPITAL | Age: 80
End: 2025-08-14
Payer: MEDICARE

## 2025-08-16 LAB
25(OH)D3+25(OH)D2 SERPL-MCNC: 82 NG/ML (ref 30–100)
ANION GAP SERPL CALCULATED.4IONS-SCNC: 10 MMOL/L (CALC) (ref 7–17)
BUN SERPL-MCNC: 29 MG/DL (ref 7–25)
BUN/CREAT SERPL: 18 (CALC) (ref 6–22)
CALCIUM SERPL-MCNC: 8.7 MG/DL (ref 8.6–10.3)
CHLORIDE SERPL-SCNC: 107 MMOL/L (ref 98–110)
CO2 SERPL-SCNC: 24 MMOL/L (ref 20–32)
CREAT SERPL-MCNC: 1.57 MG/DL (ref 0.7–1.28)
CREAT UR-MCNC: 38 MG/DL (ref 20–320)
EGFRCR SERPLBLD CKD-EPI 2021: 45 ML/MIN/1.73M2
GLUCOSE SERPL-MCNC: 202 MG/DL (ref 65–99)
POTASSIUM SERPL-SCNC: 4.8 MMOL/L (ref 3.5–5.3)
PROT UR-MCNC: 12 MG/DL (ref 5–25)
PROT/CREAT UR: 0.32 MG/MG CREAT (ref 0.03–0.15)
PROT/CREAT UR: 316 MG/G CREAT (ref 25–148)
PTH-INTACT SERPL-MCNC: 59 PG/ML (ref 16–77)
SODIUM SERPL-SCNC: 141 MMOL/L (ref 135–146)

## 2025-09-01 DIAGNOSIS — E78.2 MIXED HYPERLIPIDEMIA: ICD-10-CM

## 2025-09-02 ENCOUNTER — TELEPHONE (OUTPATIENT)
Dept: PRIMARY CARE | Facility: CLINIC | Age: 80
End: 2025-09-02
Payer: MEDICARE

## 2025-09-02 DIAGNOSIS — I48.0 PAROXYSMAL ATRIAL FIBRILLATION (MULTI): ICD-10-CM

## 2025-09-04 RX ORDER — ATORVASTATIN CALCIUM 40 MG/1
40 TABLET, FILM COATED ORAL DAILY
Qty: 90 TABLET | Refills: 3 | Status: SHIPPED | OUTPATIENT
Start: 2025-09-04

## 2025-09-05 ENCOUNTER — TELEPHONE (OUTPATIENT)
Dept: CARDIOLOGY | Facility: CLINIC | Age: 80
End: 2025-09-05
Payer: MEDICARE

## 2025-09-16 ENCOUNTER — APPOINTMENT (OUTPATIENT)
Dept: CARDIOLOGY | Facility: CLINIC | Age: 80
End: 2025-09-16
Payer: MEDICARE

## 2025-09-30 ENCOUNTER — APPOINTMENT (OUTPATIENT)
Dept: CARDIOLOGY | Facility: CLINIC | Age: 80
End: 2025-09-30
Payer: MEDICARE

## 2025-11-18 ENCOUNTER — APPOINTMENT (OUTPATIENT)
Facility: CLINIC | Age: 80
End: 2025-11-18
Payer: MEDICARE

## 2025-12-12 ENCOUNTER — APPOINTMENT (OUTPATIENT)
Dept: NEPHROLOGY | Facility: CLINIC | Age: 80
End: 2025-12-12
Payer: MEDICARE

## 2026-01-29 ENCOUNTER — APPOINTMENT (OUTPATIENT)
Dept: PRIMARY CARE | Facility: CLINIC | Age: 81
End: 2026-01-29
Payer: MEDICARE

## 2026-06-29 ENCOUNTER — APPOINTMENT (OUTPATIENT)
Dept: CARDIOLOGY | Facility: CLINIC | Age: 81
End: 2026-06-29
Payer: MEDICARE